# Patient Record
Sex: FEMALE | Race: WHITE | NOT HISPANIC OR LATINO | URBAN - METROPOLITAN AREA
[De-identification: names, ages, dates, MRNs, and addresses within clinical notes are randomized per-mention and may not be internally consistent; named-entity substitution may affect disease eponyms.]

---

## 2019-12-10 ENCOUNTER — EVALUATION (OUTPATIENT)
Dept: PHYSICAL THERAPY | Age: 54
End: 2019-12-10
Payer: COMMERCIAL

## 2019-12-10 DIAGNOSIS — R42 DIZZINESS: Primary | ICD-10-CM

## 2019-12-10 DIAGNOSIS — M54.2 CERVICAL MUSCLE PAIN: ICD-10-CM

## 2019-12-10 PROCEDURE — 97140 MANUAL THERAPY 1/> REGIONS: CPT | Performed by: PHYSICAL THERAPIST

## 2019-12-10 PROCEDURE — 97110 THERAPEUTIC EXERCISES: CPT | Performed by: PHYSICAL THERAPIST

## 2019-12-10 PROCEDURE — 97162 PT EVAL MOD COMPLEX 30 MIN: CPT | Performed by: PHYSICAL THERAPIST

## 2019-12-10 RX ORDER — CYCLOBENZAPRINE HCL 5 MG
5 TABLET ORAL 3 TIMES DAILY PRN
COMMUNITY
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
## 2019-12-11 ENCOUNTER — TRANSCRIBE ORDERS (OUTPATIENT)
Dept: PHYSICAL THERAPY | Age: 54
End: 2019-12-11

## 2019-12-11 DIAGNOSIS — M54.2 CERVICALGIA: Primary | ICD-10-CM

## 2019-12-17 ENCOUNTER — OFFICE VISIT (OUTPATIENT)
Dept: PHYSICAL THERAPY | Age: 54
End: 2019-12-17
Payer: COMMERCIAL

## 2019-12-17 DIAGNOSIS — M54.2 CERVICAL MUSCLE PAIN: ICD-10-CM

## 2019-12-17 DIAGNOSIS — R42 DIZZINESS: Primary | ICD-10-CM

## 2019-12-17 PROCEDURE — 97140 MANUAL THERAPY 1/> REGIONS: CPT | Performed by: PHYSICAL THERAPIST

## 2019-12-17 NOTE — PROGRESS NOTES
Daily Note     Today's date: 2019  Patient name: Abhilash Pena  : 1965  MRN: [id number]  Referring provider: Brian Davis MD  Dx:   Encounter Diagnosis     ICD-10-CM    1  Dizziness R42    2  Cervical muscle pain M54 2        Start Time: 96  Stop Time: 154  Total time in clinic (min): 30 minutes    Subjective: Pt reports she felt much more "clear" after her initial visit  She felt less tight and her episodes of symptoms that radiate into her neck and face are not constant and not as often  She feels she is managing them better with stretches as well  Objective: See treatment diary below      Assessment: Tolerated treatment well  Patient does seem ot have restrictions at C5-6 with decreased mobility of right UPA  She did have right scapular tightness present with trigger point in Levator scap and rhomboids today  Pt responds well to treatment and had no symptoms at the conclusion today, Cervical rotation was improved and full to right with mild left restriction of 5° less         Plan: Continue per plan of care  Will try to add exercises for posture NV       Precautions: standard      Manual  12/10 12/17           Cervical ROM  SCM stretch  U  Traps stretch  distraction 20 30                                                                   Exercise Diary  12/10 12/17           HEP 10            row  NV           posture row  NV           UBE  NV                                                                                                                                                                                                                           HEP- anterior chest opening on Swiss ball, SCM stretch    Modalities

## 2019-12-20 ENCOUNTER — OFFICE VISIT (OUTPATIENT)
Dept: PHYSICAL THERAPY | Age: 54
End: 2019-12-20
Payer: COMMERCIAL

## 2019-12-20 DIAGNOSIS — M54.2 CERVICAL MUSCLE PAIN: ICD-10-CM

## 2019-12-20 DIAGNOSIS — R42 DIZZINESS: Primary | ICD-10-CM

## 2019-12-20 PROCEDURE — 97140 MANUAL THERAPY 1/> REGIONS: CPT | Performed by: PHYSICAL THERAPIST

## 2019-12-20 PROCEDURE — 97110 THERAPEUTIC EXERCISES: CPT | Performed by: PHYSICAL THERAPIST

## 2019-12-20 NOTE — PROGRESS NOTES
Daily Note     Today's date: 2019  Patient name: Fracisco Pisano  : 1965  MRN: [id number]  Referring provider: rKis Purdy MD  Dx:   Encounter Diagnosis     ICD-10-CM    1  Dizziness R42    2  Cervical muscle pain M54 2        Start Time: 0700  Stop Time: 0740  Total time in clinic (min): 40 minutes    Subjective: Pt reports the tightness is no loner constant   Objective: See treatment diary below      Assessment: Tolerated treatment well  Patient able to add TE but did have discomfort with retro on UBE  Pt does continue to have tightness in B traps, cervical loly C4,5,6  Decreased mobility at C5-6  Plan: Continue per plan of care        Precautions: standard      Manual  12/10 12/17 12/20          Cervical ROM  SCM stretch  U  Traps stretch  distraction 20 30 30                                                                  Exercise Diary  12/10 12/17 12/20          HEP 10            row  NV 20#/ 30          posture row  NV 20          UBE  NV 4'                                                                                                                                                                                                                          HEP- anterior chest opening on Swiss ball, SCM stretch    Modalities

## 2019-12-24 ENCOUNTER — OFFICE VISIT (OUTPATIENT)
Dept: PHYSICAL THERAPY | Age: 54
End: 2019-12-24
Payer: COMMERCIAL

## 2019-12-24 DIAGNOSIS — M54.2 CERVICAL MUSCLE PAIN: ICD-10-CM

## 2019-12-24 DIAGNOSIS — R42 DIZZINESS: Primary | ICD-10-CM

## 2019-12-24 PROCEDURE — 97110 THERAPEUTIC EXERCISES: CPT | Performed by: PHYSICAL THERAPIST

## 2019-12-24 PROCEDURE — 97140 MANUAL THERAPY 1/> REGIONS: CPT | Performed by: PHYSICAL THERAPIST
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
## 2019-12-27 ENCOUNTER — OFFICE VISIT (OUTPATIENT)
Dept: PHYSICAL THERAPY | Age: 54
End: 2019-12-27
Payer: COMMERCIAL

## 2019-12-27 DIAGNOSIS — M54.2 CERVICAL MUSCLE PAIN: ICD-10-CM

## 2019-12-27 DIAGNOSIS — R42 DIZZINESS: Primary | ICD-10-CM

## 2019-12-27 PROCEDURE — 97012 MECHANICAL TRACTION THERAPY: CPT | Performed by: PHYSICAL THERAPIST

## 2019-12-27 PROCEDURE — 97110 THERAPEUTIC EXERCISES: CPT | Performed by: PHYSICAL THERAPIST

## 2019-12-27 PROCEDURE — 97140 MANUAL THERAPY 1/> REGIONS: CPT | Performed by: PHYSICAL THERAPIST

## 2019-12-27 NOTE — PROGRESS NOTES
Daily Note     Today's date: 2019  Patient name: Jannet Hernandez  : 1965  MRN: [id number]  Referring provider: Pete Lopez MD  Dx:   Encounter Diagnosis     ICD-10-CM    1  Dizziness R42    2  Cervical muscle pain M54 2        Start Time: 0700  Stop Time: 9869  Total time in clinic (min): 55 minutes    Subjective: Pt reports she felt good after last session but later that day had a flare up of her CC symptoms of pain in "SCM radiating into my ear and face  It was really fired up"  Objective: See treatment diary below      Assessment: Tolerated treatment well  Patient had no increased pain after mechanical traction and no increased pain at end of session  Plan: Continue per plan of care        Precautions: standard      Manual  12/10 12/17 12/20 12/24 12/27        Cervical ROM  SCM stretch  U  Traps stretch  distraction 20 30 30 30 30                                                                Exercise Diary  12/10 12/17 12/20 12/24 12/27        HEP 10            row  NV 20x tband 20x 20x        posture row  NV 20 20#/ 30 20#/ 30        UBE  NV 4' 4 4        cervical ext iso    10x 10x        L shoulder RTC iso     20x ER/IR/ext                                                                                                                                                                                              HEP- anterior chest opening on Swiss ball, SCM stretch    Modalities              [facility] tx     18#  15'

## 2019-12-31 ENCOUNTER — OFFICE VISIT (OUTPATIENT)
Dept: PHYSICAL THERAPY | Age: 54
End: 2019-12-31
Payer: COMMERCIAL

## 2019-12-31 DIAGNOSIS — R42 DIZZINESS: Primary | ICD-10-CM

## 2019-12-31 DIAGNOSIS — M54.2 CERVICAL MUSCLE PAIN: ICD-10-CM

## 2019-12-31 PROCEDURE — 97012 MECHANICAL TRACTION THERAPY: CPT | Performed by: PHYSICAL THERAPIST

## 2019-12-31 PROCEDURE — 97140 MANUAL THERAPY 1/> REGIONS: CPT | Performed by: PHYSICAL THERAPIST

## 2019-12-31 PROCEDURE — 97110 THERAPEUTIC EXERCISES: CPT | Performed by: PHYSICAL THERAPIST

## 2019-12-31 NOTE — PROGRESS NOTES
Daily Note     Today's date: 2019  Patient name: Olamide Lerner  : 1965  MRN: [id number]  Referring provider: Erin Vásquez MD  Dx:   Encounter Diagnosis     ICD-10-CM    1  Dizziness R42    2  Cervical muscle pain M54 2        Start Time: 0800  Stop Time: 0900  Total time in clinic (min): 60 minutes    Subjective: Pt had MRI of the brain yesterday and is awaiting results  MRI of the neck was denied but pt is considering paying for it out of pocket  Objective: See treatment diary below      Assessment: Tolerated treatment well  Patient continues to have multiple trigger points in upper traps, SCM, scalenes, cervical paraspinals  Pt responds well to Rx with decreased symptoms for several hours after PT  Pt continues to have flare ups of pain and tightness that radiates up into the right ear and face  Plan: Continue per plan of care        Precautions: standard      Manual  12/10 12/17 12/20 12/24 12/27 12/31       Cervical ROM  SCM stretch  U  Traps stretch  distraction 20 30 30 30 30 30                                                               Exercise Diary  12/10 12/17 12/20 12/24 12/27 12/31       HEP 10            row  NV 20x tband 20x 20x 20x       posture row  NV 20 20#/ 30 20#/ 30 20#/ 30       UBE  NV 4' 4 4 5'       cervical ext iso    10x 10x 10x       L shoulder RTC iso     20x ER/IR/ext 20x                                                                                                                                                                                             HEP- anterior chest opening on Swiss ball, SCM stretch    Modalities             [facility] tx     18#  15' 15'

## 2020-01-03 ENCOUNTER — OFFICE VISIT (OUTPATIENT)
Dept: PHYSICAL THERAPY | Age: 55
End: 2020-01-03
Payer: COMMERCIAL

## 2020-01-03 DIAGNOSIS — R42 DIZZINESS: Primary | ICD-10-CM

## 2020-01-03 DIAGNOSIS — M54.2 CERVICAL MUSCLE PAIN: ICD-10-CM

## 2020-01-03 PROCEDURE — 97110 THERAPEUTIC EXERCISES: CPT | Performed by: PHYSICAL THERAPIST

## 2020-01-03 PROCEDURE — 97140 MANUAL THERAPY 1/> REGIONS: CPT | Performed by: PHYSICAL THERAPIST

## 2020-01-03 PROCEDURE — 97012 MECHANICAL TRACTION THERAPY: CPT | Performed by: PHYSICAL THERAPIST

## 2020-01-03 NOTE — PROGRESS NOTES
Daily Note     Today's date: 1/3/2020  Patient name: Brittni Gusman  : 1965  MRN: [id number]  Referring provider: Kailee Stratton MD  Dx:   Encounter Diagnosis     ICD-10-CM    1  Dizziness R42    2  Cervical muscle pain M54 2        Start Time: 8404  Stop Time: 6942  Total time in clinic (min): 60 minutes    Subjective: Pt reports she is having much less frequent episodes of pain and tightness but when the muscle spasms the pain is 8/10  She no longer has constant pain and denies the dizziness and motion sensitivity she had experienced prior  "I feel like we are making progress and am much more positive"  Pt attempted basic yoga and had no symptom reproduction  Pt reports her MRI of the brain was normal       Objective: See treatment diary below      Assessment: Tolerated treatment well  Patient reports subjective improvement  PROM cervical improving  Pt remains tight in bilateral upper traps  Plan: Continue per plan of care        Precautions: standard      Manual  12/10 12/17 12/20 12/24 12/27 12/31 1/3      Cervical ROM  SCM stretch  U  Traps stretch  distraction 20 30 30 30 30 30 30'                                                              Exercise Diary  12/10 12/17 12/20 12/24 12/27 12/31 1/3      HEP 10            row  NV 20x tband 20x 20x 20x 20x      posture row  NV 20 20#/ 30 20#/ 30 20#/ 30 20#/ 30      UBE  NV 4' 4 4 5' 6'      cervical ext iso    10x 10x 10x 10      L shoulder RTC iso     20x ER/IR/ext 20x 20x                                                                                                                                                                                            HEP- anterior chest opening on Swiss ball, SCM stretch    Modalities             [facility] tx     18#  15' 15'

## 2020-01-07 ENCOUNTER — OFFICE VISIT (OUTPATIENT)
Dept: PHYSICAL THERAPY | Age: 55
End: 2020-01-07
Payer: COMMERCIAL

## 2020-01-07 DIAGNOSIS — M54.2 CERVICAL MUSCLE PAIN: ICD-10-CM

## 2020-01-07 DIAGNOSIS — R42 DIZZINESS: Primary | ICD-10-CM

## 2020-01-07 PROCEDURE — 97140 MANUAL THERAPY 1/> REGIONS: CPT | Performed by: PHYSICAL THERAPIST

## 2020-01-07 PROCEDURE — 97012 MECHANICAL TRACTION THERAPY: CPT | Performed by: PHYSICAL THERAPIST

## 2020-01-07 NOTE — PROGRESS NOTES
Daily Note     Today's date: 2020  Patient name: Willi Bone  : 1965  MRN: [id number]  Referring provider: Claudette Burrell MD  Dx:   Encounter Diagnosis     ICD-10-CM    1  Dizziness R42    2  Cervical muscle pain M54 2        Start Time: 1076  Stop Time: 09  Total time in clinic (min): 40 minutes    Subjective: Pt reports that yesterday was the best day she has had since the symptoms started months ago  She had no tightness, no episodes of facial/ear symptoms  Today she does report some tightness present in SCM  Pt is eager to try walking as she has not been able to exercise in months  Pt is reporting that the stretching and traction are helping  Objective: See treatment diary below      Assessment: Tolerated treatment well  Patient unable to finish TE due to work schedule  Decreased right cervical tightness and decreased tenderness in paraspinals  Pt remains tender along SCM  Plan: Continue per plan of care        Precautions: standard      Manual  12/10 12/17 12/20 12/24 12/27 12/31 1/3 1/7     Cervical ROM  SCM stretch  U  Traps stretch  distraction 20 30 30 30 30 30 30' 30'                                                             Exercise Diary  12/10 12/17 12/20 12/24 12/27 12/31 1/3 1/7     HEP 10            row  NV 20x tband 20x 20x 20x 20x NT     posture row  NV 20 20#/ 30 20#/ 30 20#/ 30 20#/ 30 NT     UBE  NV 4' 4 4 5' 6' NT     cervical ext iso    10x 10x 10x 10 NT     L shoulder RTC iso     20x ER/IR/ext 20x 20x NT                                                                                                                                                                                           HEP- anterior chest opening on Swiss ball, SCM stretch    Modalities      12/27 12/31 1/3 1/7     [facility] tx     18#  15' 15' 15 15

## 2020-01-10 ENCOUNTER — APPOINTMENT (OUTPATIENT)
Dept: PHYSICAL THERAPY | Age: 55
End: 2020-01-10
Payer: COMMERCIAL

## 2020-01-14 ENCOUNTER — OFFICE VISIT (OUTPATIENT)
Dept: PHYSICAL THERAPY | Age: 55
End: 2020-01-14
Payer: COMMERCIAL

## 2020-01-14 DIAGNOSIS — R42 DIZZINESS: Primary | ICD-10-CM

## 2020-01-14 DIAGNOSIS — M54.2 CERVICAL MUSCLE PAIN: ICD-10-CM

## 2020-01-14 PROCEDURE — 97012 MECHANICAL TRACTION THERAPY: CPT | Performed by: PHYSICAL THERAPIST

## 2020-01-14 PROCEDURE — 97140 MANUAL THERAPY 1/> REGIONS: CPT | Performed by: PHYSICAL THERAPIST

## 2020-01-14 PROCEDURE — 97110 THERAPEUTIC EXERCISES: CPT | Performed by: PHYSICAL THERAPIST

## 2020-01-14 NOTE — PROGRESS NOTES
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 and she is to continue PT  Neuro also felt tumor in brain was not contributing to any symptoms  Objective: See treatment diary below      Assessment: Tolerated treatment well  Patient making porgress with decreased subjective report of symptoms  Increased TE with no increased c/o during session  Focal tenderness with palpable lump at origin of SCM  Plan: Continue per plan of care        Precautions: standard      Manual  12/10 12/17 12/20 12/24 12/27 12/31 1/3 1/7 1/14    Cervical ROM  SCM stretch  U  Traps stretch  distraction 20 30 30 30 30 30 30' 30' 30'                                                            Exercise Diary  12/10 12/17 12/20 12/24 12/27 12/31 1/3 1/7 1/14    HEP 10            row  NV 20x tband 20x 20x 20x 20x NT 30x    posture row  NV 20 20#/ 30 20#/ 30 20#/ 30 20#/ 30 NT 30#/ 30    UBE  NV 4' 4 4 5' 6' NT 6'    cervical ext iso    10x 10x 10x 10 NT 30x    L shoulder RTC iso     20x ER/IR/ext 20x 20x NT 30x  B    bicep         30#/ 30    tricep         45#/ 30    Black[place]urns 3,4         15x ea    Boing         30"x3  FF                                                                                                                                      HEP- anterior chest opening on Swiss ball, SCM stretch    Modalities      12/27 12/31 1/3 1/7 1/14    [facility] tx     18#  15' 15' 15 15 15

## 2020-01-17 ENCOUNTER — TRANSCRIBE ORDERS (OUTPATIENT)
Dept: PHYSICAL THERAPY | Age: 55
End: 2020-01-17

## 2020-01-17 ENCOUNTER — OFFICE VISIT (OUTPATIENT)
Dept: PHYSICAL THERAPY | Age: 55
End: 2020-01-17
Payer: COMMERCIAL

## 2020-01-17 DIAGNOSIS — R42 DIZZINESS: Primary | ICD-10-CM

## 2020-01-17 DIAGNOSIS — M54.2 CERVICAL MUSCLE PAIN: ICD-10-CM

## 2020-01-17 DIAGNOSIS — R42 DIZZINESS AND GIDDINESS: Primary | ICD-10-CM

## 2020-01-17 PROCEDURE — 97140 MANUAL THERAPY 1/> REGIONS: CPT | Performed by: PHYSICAL THERAPIST

## 2020-01-17 PROCEDURE — 97012 MECHANICAL TRACTION THERAPY: CPT | Performed by: PHYSICAL THERAPIST

## 2020-01-17 PROCEDURE — 97110 THERAPEUTIC EXERCISES: CPT | Performed by: PHYSICAL THERAPIST
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
## 2020-01-21 ENCOUNTER — OFFICE VISIT (OUTPATIENT)
Dept: PHYSICAL THERAPY | Age: 55
End: 2020-01-21
Payer: COMMERCIAL

## 2020-01-21 DIAGNOSIS — M54.2 CERVICAL MUSCLE PAIN: ICD-10-CM

## 2020-01-21 DIAGNOSIS — R42 DIZZINESS: Primary | ICD-10-CM

## 2020-01-21 PROCEDURE — 97140 MANUAL THERAPY 1/> REGIONS: CPT | Performed by: PHYSICAL THERAPIST

## 2020-01-21 PROCEDURE — 97012 MECHANICAL TRACTION THERAPY: CPT | Performed by: PHYSICAL THERAPIST

## 2020-01-21 PROCEDURE — 97110 THERAPEUTIC EXERCISES: CPT | Performed by: PHYSICAL THERAPIST

## 2020-01-21 NOTE — PROGRESS NOTES
Daily Note     Today's date: 2020  Patient name: Edith Wong  : 1965  MRN: [id number]  Referring provider: Mar Veras MD  Dx:   Encounter Diagnosis     ICD-10-CM    1  Dizziness R42    2  Cervical muscle pain M54 2        Start Time:   Stop Time: 0900  Total time in clinic (min): 65 minutes    Subjective: Pt reports less spasms in the SCM but did note yesterday she had the muscle tighten and form "a lump" by the ear  Objective: See treatment diary below      Assessment: Tolerated treatment well  Patient has focal trigger points present in the SCM most noted distally today  Pt did tolerate acupressure and moderate stroking  May consider IASTM NV  Plan: Continue per plan of care        Precautions: standard      Manual  1/21 12/17 12/20 12/24 12/27 12/31 1/3 1/7 1/14 1/17   Cervical ROM  SCM stretch  U  Traps stretch  distraction 20 30 30 30 30 30 30' 30' 30' 30                                                           Exercise Diary  1/21 12/17 12/20 12/24 12/27 12/31 1/3 1/7 1/14 1/17   HEP             row 35#/ 30 NV 20x tband 20x 20x 20x 20x NT 30x 35#/ 30   posture row 35#/ 30 NV 20 20#/ 30 20#/ 30 20#/ 30 20#/ 30 NT 30#/ 30 35#/ 30   UBE 6' NV 4' 4 4 5' 6' NT 6' 6'   cervical ext iso 30x   10x 10x 10x 10 NT 30x 30x   L shoulder RTC iso 30x    20x ER/IR/ext 20x 20x NT 30x  B 30x   bicep 35#/ 30        30#/ 30 35#/ 30   tricep 50#/ 30        45#/ 30 50#/ 30   [place]urns 3,4 20x        15x ea 20x   Boing 30"x3        30"x3  FF 30"x3                                                                                                                                     HEP- anterior chest opening on Swiss ball, SCM stretch    Modalities  1/21    12/27 12/31 1/3 1/7 1/14 1/17   [facility] tx 15    18#  15' 15' 15 15 15 15

## 2020-01-24 ENCOUNTER — OFFICE VISIT (OUTPATIENT)
Dept: PHYSICAL THERAPY | Age: 55
End: 2020-01-24
Payer: COMMERCIAL

## 2020-01-24 DIAGNOSIS — R42 DIZZINESS: Primary | ICD-10-CM

## 2020-01-24 DIAGNOSIS — M54.2 CERVICAL MUSCLE PAIN: ICD-10-CM

## 2020-01-24 PROCEDURE — 97012 MECHANICAL TRACTION THERAPY: CPT | Performed by: PHYSICAL THERAPIST

## 2020-01-24 PROCEDURE — 97140 MANUAL THERAPY 1/> REGIONS: CPT | Performed by: PHYSICAL THERAPIST

## 2020-01-24 NOTE — PROGRESS NOTES
Daily Note     Today's date: 2020  Patient name: Brittni Gusman  : 1965  MRN: [id number]  Referring provider: Kailee Stratton MD  Dx:   Encounter Diagnosis     ICD-10-CM    1  Dizziness R42    2  Cervical muscle pain M54 2                   Subjective: Pt reports her neck "fired up" for 2 days but not as severe as it had been and did not provoke facial symptoms or dizziness  Pain /10  Current pain 2/10  Objective: See treatment diary below  Added trial of IASTM to Right SCM  Assessment: Tolerated treatment well  Patient has several trigger points along SCM muscle belly and at mastoid origion  Passive cervical ROM improved and WNL  Pt AROM remains limited at times by muscle spasm of SCM  Improved postural awareness but needs more endurance in postural muscle for work related activities on computer and prolonged driving to/from work  Pt able to now tolerate exercises/PREs  Deferred TE due to time  Plan: Continue per plan of care        Precautions: standard      Manual  1/21 1/24 12/20 12/24 12/27 12/31 1/3 1/7 1/14 1/17   Cervical ROM  SCM stretch  U  Traps stretch  distraction 20 25 30 30 30 30 30' 30' 30' 30   IASTM  5                                                      Exercise Diary  1/21 1/24 12/20 12/24 12/27 12/31 1/3 1/7 1/14 1/17   HEP             row 35#/ 30 NT 20x tband 20x 20x 20x 20x NT 30x 35#/ 30   posture row 35#/ 30 NT 20 20#/ 30 20#/ 30 20#/ 30 20#/ 30 NT 30#/ 30 35#/ 30   UBE 6' NT 4' 4 4 5' 6' NT 6' 6'   cervical ext iso 30x NT  10x 10x 10x 10 NT 30x 30x   L shoulder RTC iso 30x NT   20x ER/IR/ext 20x 20x NT 30x  B 30x   bicep 35#/ 30 NT       30#/ 30 35#/ 30   tricep 50#/ 30 NT       45#/ 30 50#/ 30   [place] 3,4 20x NT       15x ea 20x   Boing 30"x3 NT       30"x3  FF 30"x3                                                                                                                                     HEP- anterior chest opening on Swiss ball, SCM stretch    Modalities  1/21    12/27 12/31 1/3 1/7 1/14 1/17   mech tx 15    18#  15' 16' 15 15 15 15

## 2020-01-28 ENCOUNTER — OFFICE VISIT (OUTPATIENT)
Dept: PHYSICAL THERAPY | Age: 55
End: 2020-01-28
Payer: COMMERCIAL

## 2020-01-28 DIAGNOSIS — M54.2 CERVICAL MUSCLE PAIN: ICD-10-CM

## 2020-01-28 DIAGNOSIS — R42 DIZZINESS: Primary | ICD-10-CM

## 2020-01-28 PROCEDURE — 97140 MANUAL THERAPY 1/> REGIONS: CPT | Performed by: PHYSICAL THERAPIST

## 2020-01-28 PROCEDURE — 97110 THERAPEUTIC EXERCISES: CPT | Performed by: PHYSICAL THERAPIST

## 2020-01-28 PROCEDURE — 97012 MECHANICAL TRACTION THERAPY: CPT | Performed by: PHYSICAL THERAPIST

## 2020-01-31 ENCOUNTER — OFFICE VISIT (OUTPATIENT)
Dept: PHYSICAL THERAPY | Age: 55
End: 2020-01-31
Payer: COMMERCIAL

## 2020-01-31 ENCOUNTER — TELEPHONE (OUTPATIENT)
Dept: GASTROENTEROLOGY | Facility: CLINIC | Age: 55
End: 2020-01-31

## 2020-01-31 DIAGNOSIS — M54.2 CERVICAL MUSCLE PAIN: ICD-10-CM

## 2020-01-31 DIAGNOSIS — R42 DIZZINESS: Primary | ICD-10-CM

## 2020-01-31 PROCEDURE — 97012 MECHANICAL TRACTION THERAPY: CPT | Performed by: PHYSICAL THERAPIST

## 2020-01-31 PROCEDURE — 97140 MANUAL THERAPY 1/> REGIONS: CPT | Performed by: PHYSICAL THERAPIST

## 2020-01-31 NOTE — PROGRESS NOTES
Daily Note     Today's date: 2020  Patient name: Sushant Escobedo  : 1965  MRN: [id number]  Referring provider: Dicie Buerger, MD  Dx:   Encounter Diagnosis     ICD-10-CM    1  Dizziness R42    2  Cervical muscle pain M54 2                   Subjective: Pt had no episodes of intense pain, no "spasm" or "muscle firing up"  Pt reported minimal tightness  Objective: See treatment diary below      Assessment: Tolerated treatment well  Patient has less tenderness in right SCM, left does have trigger points present with tenderness  Plan: Continue per plan of care        Precautions: standard      Manual  1/21 1/24 1/28 1/31 12/27 12/31 1/3 1/7 1/14 1/17   Cervical ROM  SCM stretch  U  Traps stretch  distraction 20 25 25 25 30 30 30' 30' 30' 30   IASTM  5                                                      Exercise Diary  1/21 1/24 1/28 1/31 12/27 12/31 1/3 1/7 1/14 1/17   HEP             row 35#/ 30 NT 35#/ 30 20x 20x 20x 20x NT 30x 35#/ 30   posture row 35#/ 30 NT 35#/ 30 20#/ 30 20#/ 30 20#/ 30 20#/ 30 NT 30#/ 30 35#/ 30   UBE 6' NT 6' 4 4 5' 6' NT 6' 6'   cervical ext iso 30x NT 30x 10x 10x 10x 10 NT 30x 30x   B shoulder RTC iso 30x NT 30x ea  20x ER/IR/ext 20x 20x NT 30x  B 30x   bicep 35#/ 30 NT 35#/ 30      30#/ 30 35#/ 30   tricep 50#/ 30 NT 50#/ 30      45#/ 30 50#/ 30   [place] 3,4 20x NT 20x      15x ea 20x   Boing 30"x3 NT 30"x3      30"x3  FF 30"x3                                                                                                                                     HEP- anterior chest opening on Swiss ball, SCM stretch    Modalities  1/21 1/24 1/28 1/31 12/27 12/31 1/3 1/7 1/14 1/17   mech tx 15 10' 10'  18 static 10' 18#  15' 15' 15 15 15 15

## 2020-01-31 NOTE — TELEPHONE ENCOUNTER
Patient called to schedule routine colonoscopy  Please call [facility] at [phone] Ref: by Dr Brian Holt   If no answer please call cell at [phone] ty

## 2020-02-04 ENCOUNTER — OFFICE VISIT (OUTPATIENT)
Dept: PHYSICAL THERAPY | Age: 55
End: 2020-02-04
Payer: COMMERCIAL

## 2020-02-04 DIAGNOSIS — R42 DIZZINESS: Primary | ICD-10-CM

## 2020-02-04 DIAGNOSIS — M54.2 CERVICAL MUSCLE PAIN: ICD-10-CM

## 2020-02-04 PROCEDURE — 97140 MANUAL THERAPY 1/> REGIONS: CPT | Performed by: PHYSICAL THERAPIST

## 2020-02-04 PROCEDURE — 97012 MECHANICAL TRACTION THERAPY: CPT | Performed by: PHYSICAL THERAPIST

## 2020-02-04 NOTE — PROGRESS NOTES
Daily Note     Today's date: 2020  Patient name: Twilla Bumpers  : 1965  MRN: [id number]  Referring provider: Hermes Arita MD  Dx:   Encounter Diagnosis     ICD-10-CM    1  Dizziness R42    2  Cervical muscle pain M54 2        Start Time: 0800  Stop Time: 0840  Total time in clinic (min): 40 minutes    Subjective: Pt reports she had increased pain and muscle tightness/spasms in right SCM yesterday  She does note that the day prior, she went for a walk and attempted jogging  She noticed tightness immediately foolowing and persisted into yesterday  Today she reports the pain and spasm have decreased  Objective: See treatment diary below      Assessment: Tolerated treatment well  Patient had increased left cervical tightness, continues to have multiple trigger points in right SCM  Cervical ROM overall WNL passively  Responds well to Rx  Pt deferred TE today due to time  Plan: Continue per plan of care        Precautions: standard      Manual  1/21 1/24 1/28 1/31 2/4 12/31 1/3 1/7 1/14 1/17   Cervical ROM  SCM stretch  U  Traps stretch  distraction 20 25 25 25 30 30 30' 30' 30' 30   IASTM  5                                                      Exercise Diary   2/4 12/31 1/3 1/7 1/14 1/17   HEP             row 35#/ 30 NT 35#/ 30 20x NT 20x 20x NT 30x 35#/ 30   posture row 35#/ 30 NT 35#/ 30 20#/ 30 NT 20#/ 30 20#/ 30 NT 30#/ 30 35#/ 30   UBE 6' NT 6' 4 NT 5' 6' NT 6' 6'   cervical ext iso 30x NT 30x 10x NT 10x 10 NT 30x 30x   B shoulder RTC iso 30x NT 30x ea  NT 20x 20x NT 30x  B 30x   bicep 35#/ 30 NT 35#/ 30  NT    30#/ 30 35#/ 30   tricep 50#/ 30 NT 50#/ 30  NT    45#/ 30 50#/ 30   [place] 3,4 20x NT 20x  NT    15x ea 20x   Boing 30"x3 NT 30"x3  NT    30"x3  FF 30"x3                                                                                                                                     HEP- anterior chest opening on Swiss ball, SCM stretch    Modalities 1/21 1/24 1/28 1/31 2/4 12/31 1/3 1/7 1/14 1/17   mech tx 15 10' 8'  18 static 10' 18#  15' 16' 13 17 13 17

## 2020-02-07 ENCOUNTER — OFFICE VISIT (OUTPATIENT)
Dept: PHYSICAL THERAPY | Age: 55
End: 2020-02-07
Payer: COMMERCIAL

## 2020-02-07 DIAGNOSIS — R42 DIZZINESS: Primary | ICD-10-CM

## 2020-02-07 DIAGNOSIS — M54.2 CERVICAL MUSCLE PAIN: ICD-10-CM

## 2020-02-07 PROCEDURE — 97012 MECHANICAL TRACTION THERAPY: CPT | Performed by: PHYSICAL THERAPIST

## 2020-02-07 PROCEDURE — 97140 MANUAL THERAPY 1/> REGIONS: CPT | Performed by: PHYSICAL THERAPIST

## 2020-02-07 PROCEDURE — 97110 THERAPEUTIC EXERCISES: CPT | Performed by: PHYSICAL THERAPIST

## 2020-02-07 NOTE — PROGRESS NOTES
Daily Note     Today's date: 2020  Patient name: Vance Madrid  : 1965  MRN: [id number]  Referring provider: Yoanna Walker MD  Dx:   Encounter Diagnosis     ICD-10-CM    1  Dizziness R42    2  Cervical muscle pain M54 2        Start Time: 351  Stop Time:   Total time in clinic (min): 65 minutes    Subjective: Pt reports she had a bad day yesterday with tightness all day in her right SCM but not the severe pain she had in the past and no dizziness but slight facial symptoms  She reports today the pain has dissipated  Pt relates recent falre ups this week with trying to run over the weekend  Objective: See treatment diary below      Assessment: Tolerated treatment well  Patient has focla tenderness at mastoid portion of SCM on right, multi-nodular points on left  Bilateral trap tightness present  Plan: Continue per plan of care        Precautions: standard      Manual   2/4 2/7 1/3 1/7 1/14 1/17   Cervical ROM  SCM stretch  U  Traps stretch  distraction 20 25 25 25 30 30 30' 30' 30' 30   IASTM  5                                                      Exercise Diary   2/7 1/3 1/7 1/14 1/17   HEP             row 35#/ 30 NT 35#/ 30 20x NT 20x 20x NT 30x 35#/ 30   posture row 35#/ 30 NT 35#/ 30 20#/ 30 NT 20#/ 30 20#/ 30 NT 30#/ 30 35#/ 30   UBE 6' NT 6' 4 NT 5' 6' NT 6' 6'   cervical ext iso 30x NT 30x 10x NT 10x 10 NT 30x 30x   B shoulder RTC iso 30x NT 30x ea  NT 20x 20x NT 30x  B 30x   bicep 35#/ 30 NT 35#/ 30  NT 35#/ 30   30#/ 30 35#/ 30   tricep 50#/ 30 NT 50#/ 30  NT 50#/ 30   45#/ 30 50#/ 30   [place] 3,4 20x NT 20x  NT 20x   15x ea 20x   Boing 30"x3 NT 30"x3  NT 30"x3   30"x3  FF 30"x3                                                                                                                                     HEP- anterior chest opening on Swiss ball, SCM stretch    Modalities  1/21 1/24 1/28 1/31 2/4 12/31 1/3 1/7 1/14 1/17 [facility] tx 15 10' 10'  18 static 10' 18#  15' 15' 15 15 15 15

## 2020-02-11 ENCOUNTER — OFFICE VISIT (OUTPATIENT)
Dept: PHYSICAL THERAPY | Age: 55
End: 2020-02-11
Payer: COMMERCIAL

## 2020-02-11 DIAGNOSIS — R42 DIZZINESS: Primary | ICD-10-CM

## 2020-02-11 DIAGNOSIS — M54.2 CERVICAL MUSCLE PAIN: ICD-10-CM

## 2020-02-11 PROCEDURE — 97110 THERAPEUTIC EXERCISES: CPT | Performed by: PHYSICAL THERAPIST

## 2020-02-11 PROCEDURE — 97012 MECHANICAL TRACTION THERAPY: CPT | Performed by: PHYSICAL THERAPIST

## 2020-02-11 PROCEDURE — 97140 MANUAL THERAPY 1/> REGIONS: CPT | Performed by: PHYSICAL THERAPIST

## 2020-02-11 NOTE — PROGRESS NOTES
Daily Note     Today's date: 2020  Patient name: Lizzie Long  : 1965  MRN: [id number]  Referring provider: Libby Cancino MD  Dx:   Encounter Diagnosis     ICD-10-CM    1  Dizziness R42    2  Cervical muscle pain M54 2        Start Time:   Stop Time: 8562  Total time in clinic (min): 60 minutes    Subjective: Pt was excited, she was able to walk over the weekend 2 days, 3 miles each day without any symptom flare ups  Objective: See treatment diary below      Assessment: Tolerated treatment well  Patient continues to make progress despite occasional flare ups  She has been able to incorporate exercise including walking and is having less frequent and less intense flare ups  Focal trigger points vary day to day on right SCM  Left SCM tightness but less symptomatic compared to right  Plan: Continue per plan of care        Precautions: standard      Manual     Cervical ROM  SCM stretch  U  Traps stretch  distraction 20 25 25 25 30 30 30' 30' 30' 30   IASTM  5                                                      Exercise Diary   2   HEP             row 35#/ 30 NT 35#/ 30 20x NT 20x 35#/ 30 NT 30x 35#/ 30   posture row 35#/ 30 NT 35#/ 30 35#/ 30 NT 35#/ 30 40#/ 30 NT 30#/ 30 35#/ 30   UBE 6' NT 6' 4 NT 5' 6' NT 6' 6'   cervical ext iso 30x NT 30x 10x NT 30x 30 NT 30x 30x   B shoulder RTC iso 30x NT 30x ea  NT 20x 20x NT 30x  B 30x   bicep 35#/ 30 NT 35#/ 30  NT 35#/ 30 40#/ 30  30#/ 30 35#/ 30   tricep 50#/ 30 NT 50#/ 30  NT 50#/ 30 55#/ 30  45#/ 30 50#/ 30   [place] 3,4 20x NT 20x  NT 20x   15x ea 20x   Boing 30"x3 NT 30"x3  NT 30"x3   30"x3  FF 30"x3                                                                                                                                     HEP- anterior chest opening on Swiss ball, SCM stretch    Modalities  1/21 1 1/17   [facility] tx 15 10' 8'  18 static 10' 18#  15' 16' 15 15 15 15

## 2020-02-14 ENCOUNTER — OFFICE VISIT (OUTPATIENT)
Dept: PHYSICAL THERAPY | Age: 55
End: 2020-02-14
Payer: COMMERCIAL

## 2020-02-14 ENCOUNTER — TRANSCRIBE ORDERS (OUTPATIENT)
Dept: PHYSICAL THERAPY | Age: 55
End: 2020-02-14

## 2020-02-14 DIAGNOSIS — R42 DIZZINESS AND GIDDINESS: Primary | ICD-10-CM

## 2020-02-14 DIAGNOSIS — M54.2 CERVICAL MUSCLE PAIN: ICD-10-CM

## 2020-02-14 DIAGNOSIS — R42 DIZZINESS: Primary | ICD-10-CM

## 2020-02-14 PROCEDURE — 97140 MANUAL THERAPY 1/> REGIONS: CPT | Performed by: PHYSICAL THERAPIST

## 2020-02-14 PROCEDURE — 97012 MECHANICAL TRACTION THERAPY: CPT | Performed by: PHYSICAL THERAPIST
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
## 2020-02-18 ENCOUNTER — OFFICE VISIT (OUTPATIENT)
Dept: PHYSICAL THERAPY | Age: 55
End: 2020-02-18
Payer: COMMERCIAL

## 2020-02-18 DIAGNOSIS — R42 DIZZINESS: Primary | ICD-10-CM

## 2020-02-18 DIAGNOSIS — M54.2 CERVICAL MUSCLE PAIN: ICD-10-CM

## 2020-02-18 PROCEDURE — 97110 THERAPEUTIC EXERCISES: CPT | Performed by: PHYSICAL THERAPIST

## 2020-02-18 PROCEDURE — 97012 MECHANICAL TRACTION THERAPY: CPT | Performed by: PHYSICAL THERAPIST

## 2020-02-18 PROCEDURE — 97140 MANUAL THERAPY 1/> REGIONS: CPT | Performed by: PHYSICAL THERAPIST

## 2020-02-18 NOTE — PROGRESS NOTES
Daily Note     Today's date: 2020  Patient name: Anmol Arevalo  : 1965  MRN: [id number]  Referring provider: Rose Hubbard MD  Dx:   Encounter Diagnosis     ICD-10-CM    1  Dizziness R42    2  Cervical muscle pain M54 2        Start Time: 745  Stop Time: 5642  Total time in clinic (min): 65 minutes    Subjective: One episode of neck spasm in past week, pt does note she "feels some strain" on right SCM when performing left SB isometrics  Significant relief pf symptoms and decreased stiffness post treatment  Objective: See treatment diary below      Assessment: Tolerated treatment well  Patient had less trigger ponts present today along right SCM but did have one area at distal insertion that remains tender  No noted trigger at mastoid region today  Plan: Continue per plan of care        Precautions: standard      Manual   2   Cervical ROM  SCM stretch  U  Traps stretch  distraction 20 25 25 25 30 30 30' 30' 30' 30   IASTM  5                                                      Exercise Diary   2   HEP             row 35#/ 30 NT 35#/ 30 20x NT 20x 35#/ 30 NT 40#/ 30x 35#/ 30   posture row 35#/ 30 NT 35#/ 30 35#/ 30 NT 35#/ 30 40#/ 30 NT 40#/ 30 35#/ 30   UBE 6' NT 6' 4 NT 5' 6' NT 6' 6'   cervical ext iso 30x NT 30x 10x NT 30x 30 NT 30x 30x   B shoulder RTC iso 30x NT 30x ea  NT 20x 20x NT 30x  B 30x   bicep 35#/ 30 NT 35#/ 30  NT 35#/ 30 40#/ 30  40#/ 30 35#/ 30   tricep 50#/ 30 NT 50#/ 30  NT 50#/ 30 55#/ 30  60#/ 30 50#/ 30   Blackburns 3,4 20x NT 20x  NT 20x   30x ea 20x   Boing 30"x3 NT 30"x3  NT 30"x3   45"x3  FF 30"x3                                                                                                                                     HEP- anterior chest opening on Swiss ball, SCM stretch    Modalities  1/21 1/   [facility]  18 static 10' 18#  15' 16' 15 15 15 15

## 2020-02-21 ENCOUNTER — OFFICE VISIT (OUTPATIENT)
Dept: PHYSICAL THERAPY | Age: 55
End: 2020-02-21
Payer: COMMERCIAL

## 2020-02-21 DIAGNOSIS — M54.2 CERVICAL MUSCLE PAIN: ICD-10-CM

## 2020-02-21 DIAGNOSIS — R42 DIZZINESS: Primary | ICD-10-CM

## 2020-02-21 PROCEDURE — 97012 MECHANICAL TRACTION THERAPY: CPT | Performed by: PHYSICAL THERAPIST

## 2020-02-21 PROCEDURE — 97140 MANUAL THERAPY 1/> REGIONS: CPT | Performed by: PHYSICAL THERAPIST

## 2020-02-21 NOTE — PROGRESS NOTES
Daily Note     Today's date: 2020  Patient name: Jannet Hernandez  : 1965  MRN: [id number]  Referring provider: Pete Lopez MD  Dx:   Encounter Diagnosis     ICD-10-CM    1  Dizziness R42    2  Cervical muscle pain M54 2        Start Time: 0700  Stop Time: 0745  Total time in clinic (min): 45 minutes    Subjective: Pt reports "something feels different in the past week  Less of the intense tight feeling and more of a general muscle sorness"  Pt feels is has made more progress this week  Objective: See treatment diary below      Assessment: Tolerated treatment well  Patient has less tihtness and decreased symptoms post treatemtn  Pt defers TE today due to time constraints  Plan: Continue per plan of care        Precautions: standard      Manual     Cervical ROM  SCM stretch  U  Traps stretch  distraction 20 25 25 25 30 30 30' 30' 30' 30   IASTM  5                                                      Exercise Diary     HEP             row 35#/ 30 NT 35#/ 30 20x NT 20x 35#/ 30 NT 40#/ 30x NT   posture row 35#/ 30 NT 35#/ 30 35#/ 30 NT 35#/ 30 40#/ 30 NT 40#/ 30 NT   UBE 6' NT 6' 4 NT 5' 6' NT 6' NT   cervical ext iso 30x NT 30x 10x NT 30x 30 NT 30x NT   B shoulder RTC iso 30x NT 30x ea  NT 20x 20x NT 30x  B NT   bicep 35#/ 30 NT 35#/ 30  NT 35#/ 30 40#/ 30  40#/ 30 NT   tricep 50#/ 30 NT 50#/ 30  NT 50#/ 30 55#/ 30  60#/ 30 NT   [place]urns 3,4 20x NT 20x  NT 20x   30x ea NT   Boing 30"x3 NT 30"x3  NT 30"x3   45"x3  FF NT                                                                                                                                     HEP- anterior chest opening on Swiss ball, SCM stretch    Modalities   2   [facility]  18 static 10' 18#  15' 15' 15 15 15 15

## 2020-02-25 ENCOUNTER — OFFICE VISIT (OUTPATIENT)
Dept: PHYSICAL THERAPY | Age: 55
End: 2020-02-25
Payer: COMMERCIAL

## 2020-02-25 DIAGNOSIS — M54.2 CERVICAL MUSCLE PAIN: ICD-10-CM

## 2020-02-25 DIAGNOSIS — R42 DIZZINESS: Primary | ICD-10-CM

## 2020-02-25 PROCEDURE — 97012 MECHANICAL TRACTION THERAPY: CPT | Performed by: PHYSICAL THERAPIST

## 2020-02-25 PROCEDURE — 97140 MANUAL THERAPY 1/> REGIONS: CPT | Performed by: PHYSICAL THERAPIST

## 2020-02-25 PROCEDURE — 97110 THERAPEUTIC EXERCISES: CPT | Performed by: PHYSICAL THERAPIST

## 2020-02-25 NOTE — PROGRESS NOTES
Daily Note     Today's date: 2020  Patient name: Fracisco Pisano  : 1965  MRN: [id number]  Referring provider: Kris Purdy MD  Dx:   Encounter Diagnosis     ICD-10-CM    1  Dizziness R42    2  Cervical muscle pain M54 2        Start Time: 745  Stop Time: 7965  Total time in clinic (min): 70 minutes    Subjective: Pt reports she got a peloton bike and was bale to do beginner rides for 20 minutes with minimal discomfort  Objective: See treatment diary below      Assessment: Tolerated treatment well  Patient making good progress with less pain, decreased spams in neck and able to tolerate increased activity  Plan: Continue per plan of care        Precautions: standard      Manual     Cervical ROM  SCM stretch  U  Traps stretch  distraction  25 25 30 30 30' 30' 30' 30   IASTM  5                                                      Exercise Diary   2   HEP             row 50#/ 30 NT 35#/ 30 20x NT 20x 35#/ 30 NT 40#/ 30x NT   posture row 40#/ 30 NT 35#/ 30 35#/ 30 NT 35#/ 30 40#/ 30 NT 40#/ 30 NT   UBE 6' NT 6' 4 NT 5' 6' NT 6' NT   cervical ext iso 30x NT 30x 10x NT 30x 30 NT 30x NT   B shoulder RTC iso 30x NT 30x ea  NT 20x 20x NT 30x  B NT   bicep 40#/ 30 NT 35#/ 30  NT 35#/ 30 40#/ 30  40#/ 30 NT   tricep 60#/ 30 NT 50#/ 30  NT 50#/ 30 55#/ 30  60#/ 30 NT   [place]urns 3,4 30x NT 20x  NT 20x   30x ea NT   Boing 45"x3 NT 30"x3  NT 30"x3   45"x3  FF NT                                                                                                                                     HEP- anterior chest opening on Swiss ball, SCM stretch    Modalities   2   mech tx 15  20# 10' 10'  18 static 10' 18#  15' 15' 15 15 15 15

## 2020-02-28 ENCOUNTER — APPOINTMENT (OUTPATIENT)
Dept: PHYSICAL THERAPY | Age: 55
End: 2020-02-28
Payer: COMMERCIAL

## 2020-03-03 ENCOUNTER — OFFICE VISIT (OUTPATIENT)
Dept: PHYSICAL THERAPY | Age: 55
End: 2020-03-03
Payer: COMMERCIAL

## 2020-03-03 DIAGNOSIS — M54.2 CERVICAL MUSCLE PAIN: ICD-10-CM

## 2020-03-03 DIAGNOSIS — R42 DIZZINESS: Primary | ICD-10-CM

## 2020-03-03 PROCEDURE — 97110 THERAPEUTIC EXERCISES: CPT | Performed by: PHYSICAL THERAPIST

## 2020-03-03 PROCEDURE — 97012 MECHANICAL TRACTION THERAPY: CPT | Performed by: PHYSICAL THERAPIST

## 2020-03-03 PROCEDURE — 97140 MANUAL THERAPY 1/> REGIONS: CPT | Performed by: PHYSICAL THERAPIST

## 2020-03-03 NOTE — PROGRESS NOTES
Daily Note     Today's date: 3/3/2020  Patient name: Juanis Alvarado  : 1965  MRN: [id number]  Referring provider: Xavier Lazar MD  Dx:   Encounter Diagnosis     ICD-10-CM    1  Dizziness R42    2  Cervical muscle pain M54 2        Start Time: 745  Stop Time: 845  Total time in clinic (min): 60 minutes    Subjective: Pt reports she has been concentrating on SCM specific stretches and been using some breathing techniques and she feels the past week has been really good and no flare ups  She has been able to ride her peloton bike and do some yoga  Objective: See treatment diary below      Assessment: Tolerated treatment well  Patient continues to have decreased symtpoms, increased activity and no intense flare ups  Plan: Continue per plan of care        Precautions: standard      Manual  2/25 3/3 1/28 1/31 2/4 2/7 2/11 2/14 2/18 2/21   Cervical ROM  SCM stretch  U  Traps stretch  distraction 20 25 25 25 30 30 30' 30' 30' 30   IASTM  5                                                      Exercise Diary  2/25 3/3 1/28 1/31 2/4 2/7 2/11 2/14 2/18 2/21   HEP             row 50#/ 30 50#/ 30 35#/ 30 20x NT 20x 35#/ 30 NT 40#/ 30x NT   posture row 40#/ 30 40#/ 30 35#/ 30 35#/ 30 NT 35#/ 30 40#/ 30 NT 40#/ 30 NT   UBE 6' 6' 6' 4 NT 5' 6' NT 6' NT   cervical ext iso 30x 30x 30x 10x NT 30x 30 NT 30x NT   B shoulder RTC iso 30x 30x 30x ea  NT 20x 20x NT 30x  B NT   bicep 40#/ 30 40#/ 30 35#/ 30  NT 35#/ 30 40#/ 30  40#/ 30 NT   tricep 60#/ 30 60#/ 30 50#/ 30  NT 50#/ 30 55#/ 30  60#/ 30 NT   Black[place]urns 3,4 30x 30x 20x  NT 20x   30x ea NT   Boing 45"x3 45"x3 30"x3  NT 30"x3   45"x3  FF NT                                                                                                                                     HEP- anterior chest opening on Swiss ball, SCM stretch    Modalities  2/25 3/3 1/28    mech tx 15  20# 20#  10 10'  18 static 10 18#  15' 15' 15 15 15 15

## 2020-03-05 ENCOUNTER — TELEPHONE (OUTPATIENT)
Dept: GASTROENTEROLOGY | Facility: CLINIC | Age: 55
End: 2020-03-05

## 2020-03-05 NOTE — TELEPHONE ENCOUNTER
Patient would like to direct jayla a colonoscopy     Please phone her today     She stated he left 3 messages with no return call     [phone]

## 2020-03-05 NOTE — TELEPHONE ENCOUNTER
03/05/20  Screened by: Travis Olivia    Referring Provider     : If patient answers NO to medical questions, then schedule procedure  If patient answers YES to medical questions, then schedule office appointment  Previous Colonoscopy yes  Date and Facility of last colonoscopy? 10 years    Comments:         Pre- Screening: There is no height or weight on file to calculate BMI  Has patient been referred for a routine screening Colonoscopy? yes  Is the patient between 39-70 years old? yes    SCHEDULING STAFF   If the patient is between 45yrs-49yrs, please advise patient to confirm benefits/coverage with their insurance company for a routine screening colonoscopy, some insurance carriers will only cover at [id number] or older   If the patient is over 76years old, please schedule an office visit   If the patient had a previous colonoscopy send to the procedure  before continuing    Have you been diagnosed with a bleeding disorder or anemia? no    Do you take no    Have you been diagnosed with Diabetes or are you taking any   Diabetic medications? no    Do you have any of the following symptoms?  no    Have you had a coronary or vascular stent within the last year? no    Have you had a heart attack or stroke in the last 6 months? no    Have you had intestinal surgery in the last 3 months? no    Do you have problems with: no    Do you use: no    Have you been hospitalized in the last Month? no    Have you had chest pain (angina) or breathing problems  (COPD) in the last 3 months? no    Do you have any difficulty walking up a flight of stairs? no    Have you had Kidney failure or insufficiency? no    Have you had heart valve surgery? no    Are you confined to a wheelchair?  no

## 2020-03-10 ENCOUNTER — EVALUATION (OUTPATIENT)
Dept: PHYSICAL THERAPY | Age: 55
End: 2020-03-10
Payer: COMMERCIAL

## 2020-03-10 DIAGNOSIS — R42 DIZZINESS: Primary | ICD-10-CM

## 2020-03-10 DIAGNOSIS — M54.2 CERVICAL MUSCLE PAIN: ICD-10-CM

## 2020-03-10 PROCEDURE — 97140 MANUAL THERAPY 1/> REGIONS: CPT | Performed by: PHYSICAL THERAPIST

## 2020-03-10 PROCEDURE — 97012 MECHANICAL TRACTION THERAPY: CPT | Performed by: PHYSICAL THERAPIST
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
## 2020-03-12 ENCOUNTER — TRANSCRIBE ORDERS (OUTPATIENT)
Dept: PHYSICAL THERAPY | Age: 55
End: 2020-03-12

## 2020-03-12 DIAGNOSIS — R42 DIZZINESS AND GIDDINESS: Primary | ICD-10-CM

## 2020-03-13 ENCOUNTER — APPOINTMENT (OUTPATIENT)
Dept: PHYSICAL THERAPY | Age: 55
End: 2020-03-13
Payer: COMMERCIAL

## 2020-03-17 ENCOUNTER — OFFICE VISIT (OUTPATIENT)
Dept: PHYSICAL THERAPY | Age: 55
End: 2020-03-17
Payer: COMMERCIAL

## 2020-03-17 DIAGNOSIS — M54.2 CERVICAL MUSCLE PAIN: ICD-10-CM

## 2020-03-17 DIAGNOSIS — R42 DIZZINESS: Primary | ICD-10-CM

## 2020-03-17 PROCEDURE — 97012 MECHANICAL TRACTION THERAPY: CPT | Performed by: PHYSICAL THERAPIST

## 2020-03-17 PROCEDURE — 97140 MANUAL THERAPY 1/> REGIONS: CPT | Performed by: PHYSICAL THERAPIST

## 2020-03-17 NOTE — PROGRESS NOTES
Daily Note     Today's date: 3/17/2020  Patient name: Monalisa Green  : 1965  MRN: [id number]  Referring provider: Molina King MD  Dx:   Encounter Diagnosis     ICD-10-CM    1  Dizziness R42    2  Cervical muscle pain M54 2        Start Time: 0800  Stop Time: 0840  Total time in clinic (min): 40 minutes    Subjective: Pt reports she is feeling pretty good  She has been able to increase her exercises at home and rode her peloton bike for 45 minutes  Objective: See treatment diary below      Assessment: Tolerated treatment well  Patient continues to progress towards treatment goals and is demonstrating improved function and increased recreational activity with good toelrance  Plan: Continue per plan of care        Precautions: standard      Manual  2/25 3/3 3/10 3/16 2/4 2/7 2/11 2/14 2/18 2/21   Cervical ROM  SCM stretch  U  Traps stretch  distraction 20 25 25 25 30 30 30' 30' 30' 30   IASTM  5                                                      Exercise Diary  2/25 3/3 3/10 3/16 2/4 2/7 2/11 2/14 2/18 2/21   HEP             row 50#/ 30 50#/ 30 NT  NT 20x 35#/ 30 NT 40#/ 30x NT   posture row 40#/ 30 40#/ 30 NT  NT 35#/ 30 40#/ 30 NT 40#/ 30 NT   UBE 6' 6' NT  NT 5' 6' NT 6' NT   cervical ext iso 30x 30x NT  NT 30x 30 NT 30x NT   B shoulder RTC iso 30x 30x NT  NT 20x 20x NT 30x  B NT   bicep 40#/ 30 40#/ 30 NT  NT 35#/ 30 40#/ 30  40#/ 30 NT   tricep 60#/ 30 60#/ 30 NT  NT 50#/ 30 55#/ 30  60#/ 30 NT   Blackburns 3,4 30x 30x NT  NT 20x   30x ea NT   Boing 45"x3 45"x3 NT  NT 30"x3   45"x3  FF NT                                                                                                                                     HEP- anterior chest opening on Swiss ball, SCM stretch    Modalities  2/25 3/3 3/10 3/16 2/4 12/31 2/11 2/14 2/18 2/21   mech tx 15  20# 20#  10 10'  20# static 10'  20# static 18#  15' 15' 15 15 15 15

## 2020-03-24 ENCOUNTER — APPOINTMENT (OUTPATIENT)
Dept: PHYSICAL THERAPY | Age: 55
End: 2020-03-24
Payer: COMMERCIAL

## 2020-03-31 ENCOUNTER — APPOINTMENT (OUTPATIENT)
Dept: PHYSICAL THERAPY | Age: 55
End: 2020-03-31
Payer: COMMERCIAL

## 2020-03-31 NOTE — PROGRESS NOTES
Pt called stated she is doing well and is exercising and stretching daily at home  Pt was concerned with COVID-19 and will d/c to HEP

## 2020-10-19 ENCOUNTER — TELEPHONE (OUTPATIENT)
Dept: GASTROENTEROLOGY | Facility: CLINIC | Age: 55
End: 2020-10-19

## 2020-12-14 ENCOUNTER — TELEPHONE (OUTPATIENT)
Dept: GASTROENTEROLOGY | Facility: CLINIC | Age: 55
End: 2020-12-14

## 2020-12-18 ENCOUNTER — LAB REQUISITION (OUTPATIENT)
Dept: LAB | Facility: HOSPITAL | Age: 55
End: 2020-12-18
Payer: COMMERCIAL

## 2020-12-18 DIAGNOSIS — K63.5 POLYP OF COLON: ICD-10-CM

## 2020-12-18 PROCEDURE — 88305 TISSUE EXAM BY PATHOLOGIST: CPT | Performed by: PATHOLOGY

## 2020-12-21 ENCOUNTER — TELEPHONE (OUTPATIENT)
Dept: GASTROENTEROLOGY | Facility: CLINIC | Age: 55
End: 2020-12-21

## 2021-04-10 ENCOUNTER — IMMUNIZATIONS (OUTPATIENT)
Dept: FAMILY MEDICINE CLINIC | Facility: HOSPITAL | Age: 56
End: 2021-04-10

## 2021-04-10 DIAGNOSIS — Z23 ENCOUNTER FOR IMMUNIZATION: Primary | ICD-10-CM

## 2021-04-10 PROCEDURE — 91300 SARS-COV-2 / COVID-19 MRNA VACCINE (PFIZER-BIONTECH) 30 MCG: CPT

## 2021-04-10 PROCEDURE — 0001A SARS-COV-2 / COVID-19 MRNA VACCINE (PFIZER-BIONTECH) 30 MCG: CPT

## 2021-05-09 ENCOUNTER — IMMUNIZATIONS (OUTPATIENT)
Dept: FAMILY MEDICINE CLINIC | Facility: HOSPITAL | Age: 56
End: 2021-05-09

## 2021-05-09 DIAGNOSIS — Z23 ENCOUNTER FOR IMMUNIZATION: Primary | ICD-10-CM

## 2021-05-09 PROCEDURE — 91300 SARS-COV-2 / COVID-19 MRNA VACCINE (PFIZER-BIONTECH) 30 MCG: CPT

## 2021-05-09 PROCEDURE — 0002A SARS-COV-2 / COVID-19 MRNA VACCINE (PFIZER-BIONTECH) 30 MCG: CPT

## 2023-07-31 ENCOUNTER — APPOINTMENT (OUTPATIENT)
Dept: RADIOLOGY | Facility: CLINIC | Age: 58
End: 2023-07-31
Payer: COMMERCIAL

## 2023-07-31 ENCOUNTER — OFFICE VISIT (OUTPATIENT)
Dept: URGENT CARE | Facility: CLINIC | Age: 58
End: 2023-07-31
Payer: COMMERCIAL

## 2023-07-31 VITALS
OXYGEN SATURATION: 96 % | SYSTOLIC BLOOD PRESSURE: 123 MMHG | HEART RATE: 77 BPM | TEMPERATURE: 99.8 F | RESPIRATION RATE: 18 BRPM | DIASTOLIC BLOOD PRESSURE: 79 MMHG

## 2023-07-31 DIAGNOSIS — M79.631 RIGHT FOREARM PAIN: ICD-10-CM
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
## 2023-07-31 DIAGNOSIS — S50.11XA CONTUSION OF RIGHT FOREARM, INITIAL ENCOUNTER: ICD-10-CM

## 2023-07-31 DIAGNOSIS — M79.631 RIGHT FOREARM PAIN: Primary | ICD-10-CM

## 2023-07-31 PROCEDURE — 73090 X-RAY EXAM OF FOREARM: CPT

## 2023-07-31 PROCEDURE — 99213 OFFICE O/P EST LOW 20 MIN: CPT

## 2023-07-31 RX ORDER — FLUOXETINE 10 MG/1
10 CAPSULE ORAL DAILY
COMMUNITY
Start: 2023-05-22

## 2023-07-31 RX ORDER — CHLORAL HYDRATE 500 MG
CAPSULE ORAL
COMMUNITY

## 2023-07-31 RX ORDER — MULTIVIT WITH MINERALS/LUTEIN
TABLET ORAL
COMMUNITY

## 2023-07-31 NOTE — PATIENT INSTRUCTIONS
Check my chart for final radiology results. Tylenol/motrin as needed for pain/swelling. Ice/elevate. Follow up with PCP in 3-5 days. Proceed to the ER with worsening symptoms.

## 2023-07-31 NOTE — PROGRESS NOTES
[facility]        NAME: Daja Mckeon is a 63 y.o. female  : 1965    MRN: [id number]  DATE: 2023  TIME: 12:13 PM    Assessment and Plan   Right forearm pain [M79.631]  1. Right forearm pain  XR forearm 2 vw right      2. Fall, initial encounter  XR forearm 2 vw right      3. Contusion of right forearm, initial encounter          Xray appears negative for any fracture. Will follow up with radiologist report when available. Patient Instructions     Check my chart for final radiology results. Tylenol/motrin as needed for pain/swelling. Ice/elevate. Follow up with PCP in 3-5 days. Proceed to the ER with worsening symptoms. Chief Complaint     Chief Complaint   Patient presents with   • arm injury     Patient was coming down steps and fell onto right arm. Bruising present. Reports arm is feeling numb and heavy. Took aleve with some relief. History of Present Illness       The patient presents today with complaints of R forearm pain after falling down a few indoor stairs this morning. She denies any other injury. She is having pain to her proximal forearm just below her elbow. She has a superficial abrasion with mild ecchymosis and swelling. She has full ROM to her elbow and wrist. Denies history of previous fracture/surgery. Review of Systems   Review of Systems   Constitutional: Negative for chills and fever. Musculoskeletal: Positive for arthralgias (R forearm). Negative for myalgias. Skin: Positive for color change (R forearm). Negative for rash. Psychiatric/Behavioral: Negative.           Current Medications       Current Outpatient Medications:   •  Ascorbic Acid (vitamin C) 1000 MG tablet, Vitamin C, Disp: , Rfl:   •  FLUoxetine (PROzac) 10 mg capsule, Take 10 mg by mouth daily, Disp: , Rfl:   •  Omega-3 Fatty Acids (fish oil) 1,000 mg, Fish Oil, Disp: , Rfl:   •  Omega-3 Fatty Acids (Omega-3 Fish Oil) 1000 MG CAPS, Fish Oil, Disp: , Rfl:   • cyclobenzaprine (FLEXERIL) 5 mg tablet, Take 5 mg by mouth 3 (three) times a day as needed for muscle spasms (Patient not taking: Reported on 7/31/2023), Disp: , Rfl:     Current Allergies     Allergies as of 07/31/2023   • (No Known Allergies)            The following portions of the patient's history were reviewed and updated as appropriate: allergies, current medications, past family history, past medical history, past social history, past surgical history and problem list.     History reviewed. No pertinent past medical history. Past Surgical History:   Procedure Laterality Date   • BUNIONECTOMY Right        History reviewed. No pertinent family history. Medications have been verified. Objective   /79   Pulse 77   Temp 99.8 °F (37.7 °C)   Resp 18   SpO2 96%        Physical Exam     Physical Exam  Vitals and nursing note reviewed. Constitutional:       General: She is not in acute distress. Appearance: Normal appearance. She is not ill-appearing. HENT:      Head: Normocephalic and atraumatic. Right Ear: External ear normal.      Left Ear: External ear normal.      Nose: Nose normal.      Mouth/Throat:      Lips: Pink. Mouth: Mucous membranes are moist.   Eyes:      General: Vision grossly intact. Extraocular Movements: Extraocular movements intact. Pupils: Pupils are equal, round, and reactive to light. Cardiovascular:      Rate and Rhythm: Normal rate. Pulmonary:      Effort: Pulmonary effort is normal.   Musculoskeletal:         General: Normal range of motion. Right forearm: Swelling (mild), tenderness and bony tenderness present. No deformity. Left forearm: Normal.        Arms:       Cervical back: Normal range of motion. Skin:     General: Skin is warm. Findings: Abrasion (R forearm) and ecchymosis (R forearm) present. No rash. Neurological:      Mental Status: She is alert and oriented to person, place, and time.       Motor: Motor function is intact. Gait: Gait is intact.    Psychiatric:         Attention and Perception: Attention normal.         Mood and Affect: Mood normal.

## 2024-08-28 ENCOUNTER — OFFICE VISIT (OUTPATIENT)
Dept: GASTROENTEROLOGY | Facility: CLINIC | Age: 59
End: 2024-08-28
Payer: COMMERCIAL

## 2024-08-28 VITALS
HEART RATE: 84 BPM | BODY MASS INDEX: 24.23 KG/M2 | SYSTOLIC BLOOD PRESSURE: 135 MMHG | TEMPERATURE: 97.4 F | OXYGEN SATURATION: 98 % | WEIGHT: 154.4 LBS | DIASTOLIC BLOOD PRESSURE: 86 MMHG | HEIGHT: 67 IN

## 2024-08-28 DIAGNOSIS — K21.9 GASTROESOPHAGEAL REFLUX DISEASE WITHOUT ESOPHAGITIS: ICD-10-CM

## 2024-08-28 DIAGNOSIS — K59.01 SLOW TRANSIT CONSTIPATION: Primary | ICD-10-CM

## 2024-08-28 PROCEDURE — 99204 OFFICE O/P NEW MOD 45 MIN: CPT | Performed by: INTERNAL MEDICINE

## 2024-08-28 RX ORDER — LIFITEGRAST 50 MG/ML
SOLUTION/ DROPS OPHTHALMIC
COMMUNITY
Start: 2024-08-27

## 2024-08-28 RX ORDER — FAMOTIDINE 20 MG/1
20 TABLET, FILM COATED ORAL 2 TIMES DAILY
Qty: 62 TABLET | Refills: 11 | Status: SHIPPED | OUTPATIENT
Start: 2024-08-28

## 2024-08-28 RX ORDER — POLYETHYLENE GLYCOL 3350 17 G/17G
POWDER, FOR SOLUTION ORAL
COMMUNITY
Start: 1995-01-01

## 2024-08-28 NOTE — PROGRESS NOTES
[facility] - Outpatient Consultation  Meche Lrema 59 y.o. female MRN: [id number]  Encounter: [id number]          ASSESSMENT AND PLAN:      1. Slow transit constipation  -No indication for EGD or colonoscopy  -We discussed the absence of promotility drugs for the colon  -Preferably MiraLAX taken twice daily should be the current recommended therapy  -We discussed Linzess as an alternative or addition if needed  -High-fiber diet to include fruits, vegetables, and whole grain foods, daily  -Increase water intake daily  -Colonoscopy was reviewed from 12/18/2020  -We had a full discussion about motility problems with the colon.  I told her that her motility was slowed and that there are no promotility drugs available on the market.  We discussed the concept of the osmotic effect or benefit from drugs like MiraLAX or Linzess.  I offered the possibility of utilizing Linzess in the future if the double dose of MiraLAX is unsuccessful.    2. Gastroesophageal reflux disease without esophagitis  - famotidine (PEPCID) 20 mg tablet; Take 1 tablet (20 mg total) by mouth 2 (two) times a day  Dispense: 62 tablet; Refill: 11  -The above famotidine will be tried initially.  I told her that it is possible that thisdose can be increased to 40 mg twice daily.  Alternatively, we can use a proton pump inhibitor in the morning time and use a H2 antagonist like Pepcid in the evening time.  -No lying down within 1 hour of snacking or eating  -The GERD handout sheet was provided to the patient and discussed in detail  -Elevate the back of the bed 6 inches permanently.  -I had a full discussion about the extra esophageal manifestations of gastroesophageal reflux disease.  I told her it is not unusual to have congestion and cough without heartburn symptoms.  I told her that 30% of the patients with gastroesophageal reflux disease experience an extra and esophageal manifestation  -No indication for surgical  therapy such as fundoplication or TIF  -CT scan of the chest with contrast on 7/11/2024 was reviewed    Follow-up in 2 months    ______________________________________________________________________    HPI: Isis is a 59-year-old female who comes the office today with a complaint of constant chronic cough and inability to clear her throat with an uncomfortable and weird feeling in the upper chest.  She was tried on inhalers because there was a suspicion that this might be asthma but the inhalers did not work.  She also complains of some intermittent hoarseness.  She underwent treatment with a steroid pack as well as antibiotics and had no relief of the symptoms with this.  She tried steroid inhalation without any relief.  CT scan of the lungs with contrast was performed on on 7/11/2024.  There was no significant abnormality seen on the CT scan.  She has never undergone an EGD.    Additionally she has been having problems with constipation for many years.  She has been taking MiraLAX 1 capful in 8 fluid ounces of water daily with some benefit.  She has had to add citrate of magnesium to this and wants to know what additional steps could be taken to help her with the constipation problem.  On some days her bowel movements are daily, however another days her bowel movements could be every 2 to 3 days.  This was the reason why she has been adding additional citrate of magnesium.      REVIEW OF SYSTEMS:    CONSTITUTIONAL: Denies any fever, chills, rigors, and weight loss.  HEENT: No earache or tinnitus. Denies hearing loss or visual disturbances.  CARDIOVASCULAR: No chest pain or palpitations.   RESPIRATORY: Denies any cough, hemoptysis, shortness of breath or dyspnea on exertion.  GASTROINTESTINAL: As noted in the History of Present Illness.   GENITOURINARY: No problems with urination. Denies any hematuria or dysuria.  NEUROLOGIC: No dizziness or vertigo, denies headaches.   MUSCULOSKELETAL: Denies any muscle or  "joint pain.   SKIN: Denies skin rashes or itching.   ENDOCRINE: Denies excessive thirst. Denies intolerance to heat or cold.  PSYCHOSOCIAL: Denies depression or anxiety. Denies any recent memory loss.       Historical Information   History reviewed. No pertinent past medical history.  Past Surgical History:   Procedure Laterality Date    BUNIONECTOMY Right     COLONOSCOPY       Social History   Social History     Substance and Sexual Activity   Alcohol Use Yes    Alcohol/week: 1.0 standard drink of alcohol    Types: 1 Glasses of wine per week     Social History     Substance and Sexual Activity   Drug Use Never     Social History     Tobacco Use   Smoking Status Never   Smokeless Tobacco Never     Family History   Problem Relation Age of Onset    Hypertension Mother     Colon polyps Father     Heart disease Father     Colon cancer Paternal Grandmother        Meds/Allergies       Current Outpatient Medications:     Ascorbic Acid (vitamin C) 1000 MG tablet    cyclobenzaprine (FLEXERIL) 5 mg tablet    famotidine (PEPCID) 20 mg tablet    FLUoxetine (PROzac) 10 mg capsule    Magnesium 125 MG CAPS    Omega-3 Fatty Acids (Omega-3 Fish Oil) 1000 MG CAPS    polyethylene glycol (MiraLax) 17 GM/SCOOP powder    Xiidra 5 % op solution    No Known Allergies        Objective     Blood pressure 135/86, pulse 84, temperature (!) 97.4 °F (36.3 °C), temperature source Temporal, height 5' 7\" (1.702 m), weight 70 kg (154 lb 6.4 oz), SpO2 98%. Body mass index is 24.18 kg/m².        PHYSICAL EXAM:      General Appearance:   Alert, cooperative, no distress   HEENT:   Normocephalic, atraumatic, anicteric.     Neck:  Supple, symmetrical, trachea midline   Lungs:   Clear to auscultation bilaterally; no rales, rhonchi or wheezing; respirations unlabored    Heart::   Regular rate and rhythm; no murmur, rub, or gallop.   Abdomen:   Soft, non-tender, non-distended; normal bowel sounds; no masses, no organomegaly    Genitalia:   Deferred  "   Rectal:   Deferred    Extremities:  No cyanosis, clubbing or edema    Pulses:  2+ and symmetric    Skin:  No jaundice, rashes, or lesions    Lymph nodes:  No palpable cervical lymphadenopathy        Lab Results:   No visits with results within 1 Day(s) from this visit.   Latest known visit with results is:   Lab Requisition on 12/18/2020   Component Date Value    Case Report 12/18/2020                      Value:Surgical Pathology Report                         Case: [id number]                                   Authorizing Provider:  Tobais Correa DO          Collected:           12/18/2020 0732              Ordering Location:     [facility]      Received:            12/18/2020 [address] [place] [address] Specialty                                                                                  Laboratory                                                                   Pathologist:           Cristal Villalobos MD                                                                  Specimens:   A) - Polyp, Colorectal, ascending colon                                                             B) - Polyp, Colorectal, descending colon                                                   Final Diagnosis 12/18/2020                      Value:A. Colon, Ascending colon, Polypectomy:                          - Tubular adenoma.                          - No high grade dysplasia and no evidence of malignancy.                                                     B. Colon, Descending colon, Polypectomy:                          - Tubular adenoma.                          - No high grade dysplasia and no evidence of malignancy.     Additional Information 12/18/2020                      Value:All reported additional testing was performed with appropriately reactive                           controls.  These tests were developed and their performance                           characteristics determined by [facility] or                           appropriate performing facility, though some tests may be performed on                           tissues which have not been validated for performance characteristics                           (such as staining performed on alcohol exposed cell blocks and decalcified                           tissues).  Results should be interpreted with caution and in the context                           of the patients’ clinical condition. These tests may not be cleared or                           approved by the U.S. Food and Drug Administration, though the FDA has                           determined that such clearance or approval is not necessary. These tests                           are used for clinical purposes and they should not be regarded as                           investigational or for research. This laboratory has been approved by CLIA                           88, designated as a high-complexity laboratory and is qualified to perform                           these tests.                                                    Interpretation performed at [facility], [address] [place] [address]                           [id number]    Synoptic Checklist 12/18/2020                      Value:  (COLON/RECTUM POLYP FORM - GI - All Specimens)                                                                                                                 :    Adenoma(s)      Gross Description 12/18/2020                      Value:                           A. The specimen is received in formalin, labeled with the patient's name                           and hospital number, and is designated \" ascending colon”.  The specimen                           consists of 2 tan flat and elongated soft tissue fragments measuring                           0.3-1.0 cm in greatest dimension.  The specimen is entirely submitted in a                           screened cassette.        " "                                                                          B. The specimen is received in formalin, labeled with the patient's name                           and hospital number, and is designated \"descending colon”.  The specimen                           consists of a single tan soft tissue fragment measuring 0.1 cm in greatest                           dimension.  The specimen is entirely submitted in a screened cassette.                                                    Note: The estimated total formalin fixation time based upon information                           provided by the submitting clinician and the standard processing schedule                           is under 72 hours.                                                    [facility]                                      Radiology Results:   No results found.  Answers submitted by the patient for this visit:  Questionnaire about: Abdominal pain (Submitted on 8/25/2024)  Chief Complaint: Abdominal pain    "

## 2024-09-09 ENCOUNTER — TELEPHONE (OUTPATIENT)
Dept: GASTROENTEROLOGY | Facility: CLINIC | Age: 59
End: 2024-09-09

## 2024-09-09 DIAGNOSIS — K21.9 GASTROESOPHAGEAL REFLUX DISEASE WITHOUT ESOPHAGITIS: Primary | ICD-10-CM

## 2024-09-09 RX ORDER — PANTOPRAZOLE SODIUM 40 MG/1
40 TABLET, DELAYED RELEASE ORAL
Qty: 62 TABLET | Refills: 6 | Status: SHIPPED | OUTPATIENT
Start: 2024-09-09

## 2024-09-23 ENCOUNTER — TELEPHONE (OUTPATIENT)
Age: 59
End: 2024-09-23

## 2024-10-03 DIAGNOSIS — K21.9 GASTROESOPHAGEAL REFLUX DISEASE WITHOUT ESOPHAGITIS: ICD-10-CM

## 2024-10-03 RX ORDER — PANTOPRAZOLE SODIUM 40 MG/1
40 TABLET, DELAYED RELEASE ORAL
Qty: 180 TABLET | Refills: 1 | Status: SHIPPED | OUTPATIENT
Start: 2024-10-03

## 2024-10-04 ENCOUNTER — NURSE TRIAGE (OUTPATIENT)
Age: 59
End: 2024-10-04

## 2024-10-04 ENCOUNTER — TELEPHONE (OUTPATIENT)
Age: 59
End: 2024-10-04

## 2024-10-04 NOTE — TELEPHONE ENCOUNTER
Patients GI provider:  Dr. Correa    Number to return call: [phone]    Reason for call: Pt calling with continued symptoms, meds not working. Transferred to triage nurse.    Scheduled procedure/appointment date if applicable: 10/18/24

## 2024-10-04 NOTE — TELEPHONE ENCOUNTER
Patient states put on pantoprazole for acid reflux - helped but not better.   Maybe 80%.  Deep breaths makes cough.  States if patient jogs down stairs or runs, starts coughing and food is coming into throat.  Patient sleeps on incline but affecting her during day.  Patient unable to exert herself due food regurgitating.  Patient states she feels as if food is not digesting, food coming back up whole.  States stomach is hard and is unable to eat proteins.    .    Reason for Disposition   The patient has regurgitation    Answer Assessment - Initial Assessment Questions  1. Do you have a history of GERD?  yes  2. When did your symptoms start? Please describe your symptoms and how often you experience these symptoms.  Months ago  3. Are you taking any acid reducing medications currently such as: Prilosec (Omeprazole), Protonix (Pantoprazole), Prevacid (Lansoprazole), Nexium (Esomeprazole), Aciphex (Rabeprazole), Dexilant (Dexlansoprazole)?  Pantoprazole  4. Have you tried any OTC acid reducing medications? (If so, which medications have you tried?) Zantac (Ranitidine), Pepcid (Famotidine), Tagamet (Cimetidine) Tums, Rolaids, Maalox, Mylanta.  pepcid  5. What was the outcome of taking the medications which you have for these symptoms?  Better but not great    Protocols used: GI-Gerd-ADULT-[place]

## 2024-10-11 RX ORDER — NAPROXEN SODIUM 220 MG/1
TABLET, FILM COATED ORAL
COMMUNITY

## 2024-10-15 ENCOUNTER — OFFICE VISIT (OUTPATIENT)
Dept: GASTROENTEROLOGY | Facility: CLINIC | Age: 59
End: 2024-10-15
Payer: COMMERCIAL

## 2024-10-15 VITALS
HEIGHT: 67 IN | SYSTOLIC BLOOD PRESSURE: 138 MMHG | BODY MASS INDEX: 24.01 KG/M2 | WEIGHT: 153 LBS | OXYGEN SATURATION: 98 % | DIASTOLIC BLOOD PRESSURE: 90 MMHG | HEART RATE: 81 BPM | TEMPERATURE: 97.5 F

## 2024-10-15 DIAGNOSIS — K21.9 GASTROESOPHAGEAL REFLUX DISEASE WITHOUT ESOPHAGITIS: Primary | ICD-10-CM

## 2024-10-15 PROCEDURE — 99214 OFFICE O/P EST MOD 30 MIN: CPT | Performed by: INTERNAL MEDICINE

## 2024-10-15 RX ORDER — FAMOTIDINE 20 MG/1
20 TABLET, FILM COATED ORAL 2 TIMES DAILY
Qty: 31 TABLET | Refills: 3 | Status: SHIPPED | OUTPATIENT
Start: 2024-10-15

## 2024-10-16 NOTE — PROGRESS NOTES
[facility] - Outpatient Follow-up Note  Meche Lerma 59 y.o. female MRN: [id number]  Encounter: [id number]          ASSESSMENT AND PLAN:      1. Gastroesophageal reflux disease without esophagitis    - famotidine (PEPCID) 20 mg tablet; Take 1 tablet (20 mg total) by mouth 2 (two) times a day  Dispense: 31 tablet; Refill: 3  - EGD; Future  - Bravo pH Monitoring (must also order EGD separate); Future  - Esophageal manometry; Future  -Continue pantoprazole 40 mg p.o. twice daily  -Increase Pepcid to 40 mg at bedtime  -Continue to sleep upright since patient the bed to 30 degrees was not beneficial    We had a long discussion on how it appears to be necessary to perform 48-hour pH monitoring as well as esophageal manometry in preparation for possible surgical or endoscopic fundoplication.    ______________________________________________________________________    SUBJECTIVE: Asya returns to the office today stating that taking Pepcid twice daily did not improve her symptoms.  She states that taking pantoprazole twice a day and Pepcid 20 mg at bedtime only provides 50% improvement.  She still experiencing chest pain which is a burning sensation.  Her throat frequently becomes hoarse as a consequence of acid reflux.  She is describing indigestion as well as a bloated abdomen.  Her constipation problem has improved by taking MiraLAX either daily or every other day.  She is now having bowel movements daily.  She states that her coughing is worse.  She tried elevating the back of her bed to 30 degrees but this was not beneficial and in the middle of the night she finds herself experiencing worsening coughing as well as hoarseness.  She states that she is living on eggs and yogurt.  She commented that eating protein such as meats seems to make her symptoms worse.  All the symptoms began in Easter of 2024.       REVIEW OF SYSTEMS IS OTHERWISE NEGATIVE.      Historical Information   History  "reviewed. No pertinent past medical history.  Past Surgical History:   Procedure Laterality Date    BUNIONECTOMY Right     COLONOSCOPY       Social History   Social History     Substance and Sexual Activity   Alcohol Use Yes    Alcohol/week: 1.0 standard drink of alcohol    Types: 1 Glasses of wine per week     Social History     Substance and Sexual Activity   Drug Use Never     Social History     Tobacco Use   Smoking Status Never   Smokeless Tobacco Never     Family History   Problem Relation Age of Onset    Hypertension Mother     Colon polyps Father     Heart disease Father     Colon cancer Paternal Grandmother        Meds/Allergies       Current Outpatient Medications:     Ascorbic Acid (VITAMIN C PO)    Ascorbic Acid (vitamin C) 1000 MG tablet    cyclobenzaprine (FLEXERIL) 5 mg tablet    famotidine (PEPCID) 20 mg tablet    famotidine (PEPCID) 20 mg tablet    FLUoxetine (PROzac) 10 mg capsule    Magnesium 125 MG CAPS    naproxen sodium (Aleve) 220 MG tablet    Omega-3 Fatty Acids (Omega-3 Fish Oil) 1000 MG CAPS    pantoprazole (PROTONIX) 40 mg tablet    polyethylene glycol (MiraLax) 17 GM/SCOOP powder    Xiidra 5 % op solution    No Known Allergies        Objective     Blood pressure 138/90, pulse 81, temperature 97.5 °F (36.4 °C), temperature source Temporal, height 5' 7\" (1.702 m), weight 69.4 kg (153 lb), SpO2 98%. Body mass index is 23.96 kg/m².      PHYSICAL EXAM:      General Appearance:   Alert, cooperative, no distress   HEENT:   Normocephalic, atraumatic, anicteric.     Neck:  Supple, symmetrical, trachea midline   Lungs:   Clear to auscultation bilaterally; no rales, rhonchi or wheezing; respirations unlabored    Heart::   Regular rate and rhythm; no murmur, rub, or gallop.   Abdomen:   Soft, non-tender, non-distended; normal bowel sounds; no masses, no organomegaly    Genitalia:   Deferred    Rectal:   Deferred    Extremities:  No cyanosis, clubbing or edema    Pulses:  2+ and symmetric    Skin:  No " jaundice, rashes, or lesions    Lymph nodes:  No palpable cervical lymphadenopathy        Lab Results:   No visits with results within 1 Day(s) from this visit.   Latest known visit with results is:   Lab Requisition on 12/18/2020   Component Date Value    Case Report 12/18/2020                      Value:Surgical Pathology Report                         Case: [id number]                                   Authorizing Provider:  Tobias Correa DO          Collected:           12/18/2020 0732              Ordering Location:     [facility]      Received:            12/18/2020 [address] [place] [address] Specialty                                                                                  Laboratory                                                                   Pathologist:           Cristal Villalobos MD                                                                  Specimens:   A) - Polyp, Colorectal, ascending colon                                                             B) - Polyp, Colorectal, descending colon                                                   Final Diagnosis 12/18/2020                      Value:A. Colon, Ascending colon, Polypectomy:                          - Tubular adenoma.                          - No high grade dysplasia and no evidence of malignancy.                                                     B. Colon, Descending colon, Polypectomy:                          - Tubular adenoma.                          - No high grade dysplasia and no evidence of malignancy.     Additional Information 12/18/2020                      Value:All reported additional testing was performed with appropriately reactive                           controls.  These tests were developed and their performance                           characteristics determined by [facility] or                           appropriate performing facility, though some tests may be  "performed on                           tissues which have not been validated for performance characteristics                           (such as staining performed on alcohol exposed cell blocks and decalcified                           tissues).  Results should be interpreted with caution and in the context                           of the patients’ clinical condition. These tests may not be cleared or                           approved by the U.S. Food and Drug Administration, though the FDA has                           determined that such clearance or approval is not necessary. These tests                           are used for clinical purposes and they should not be regarded as                           investigational or for research. This laboratory has been approved by CLIA                           88, designated as a high-complexity laboratory and is qualified to perform                           these tests.                                                    Interpretation performed at [facility], [address] [place] [address]                           [id number]    Synoptic Checklist 12/18/2020                      Value:  (COLON/RECTUM POLYP FORM - GI - All Specimens)                                                                                                                 :    Adenoma(s)      Gross Description 12/18/2020                      Value:                           A. The specimen is received in formalin, labeled with the patient's name                           and hospital number, and is designated \" ascending colon”.  The specimen                           consists of 2 tan flat and elongated soft tissue fragments measuring                           0.3-1.0 cm in greatest dimension.  The specimen is entirely submitted in a                           screened cassette.                                                                                  B. The specimen is received in formalin, " "labeled with the patient's name                           and hospital number, and is designated \"descending colon”.  The specimen                           consists of a single tan soft tissue fragment measuring 0.1 cm in greatest                           dimension.  The specimen is entirely submitted in a screened cassette.                                                    Note: The estimated total formalin fixation time based upon information                           provided by the submitting clinician and the standard processing schedule                           is under 72 hours.                                                    [facility]                                      Radiology Results:   No results found.  "

## 2024-10-22 ENCOUNTER — HOSPITAL ENCOUNTER (OUTPATIENT)
Dept: GASTROENTEROLOGY | Facility: HOSPITAL | Age: 59
Setting detail: OUTPATIENT SURGERY
Discharge: HOME | End: 2024-10-22
Attending: INTERNAL MEDICINE
Payer: COMMERCIAL

## 2024-10-22 ENCOUNTER — ANESTHESIA (OUTPATIENT)
Dept: GASTROENTEROLOGY | Facility: HOSPITAL | Age: 59
End: 2024-10-22
Payer: COMMERCIAL

## 2024-10-22 ENCOUNTER — ANESTHESIA EVENT (OUTPATIENT)
Dept: GASTROENTEROLOGY | Facility: HOSPITAL | Age: 59
End: 2024-10-22
Payer: COMMERCIAL

## 2024-10-22 VITALS
TEMPERATURE: 97.4 F | HEART RATE: 60 BPM | OXYGEN SATURATION: 99 % | DIASTOLIC BLOOD PRESSURE: 70 MMHG | SYSTOLIC BLOOD PRESSURE: 110 MMHG | HEIGHT: 67 IN | WEIGHT: 154.32 LBS | RESPIRATION RATE: 16 BRPM | BODY MASS INDEX: 24.22 KG/M2

## 2024-10-22 DIAGNOSIS — K21.9 GASTROESOPHAGEAL REFLUX DISEASE WITHOUT ESOPHAGITIS: ICD-10-CM

## 2024-10-22 PROCEDURE — 43235 EGD DIAGNOSTIC BRUSH WASH: CPT | Performed by: INTERNAL MEDICINE

## 2024-10-22 RX ORDER — SODIUM CHLORIDE, SODIUM LACTATE, POTASSIUM CHLORIDE, CALCIUM CHLORIDE 600; 310; 30; 20 MG/100ML; MG/100ML; MG/100ML; MG/100ML
INJECTION, SOLUTION INTRAVENOUS CONTINUOUS PRN
Status: DISCONTINUED | OUTPATIENT
Start: 2024-10-22 | End: 2024-10-22

## 2024-10-22 RX ORDER — LIDOCAINE HYDROCHLORIDE 20 MG/ML
INJECTION, SOLUTION EPIDURAL; INFILTRATION; INTRACAUDAL; PERINEURAL AS NEEDED
Status: DISCONTINUED | OUTPATIENT
Start: 2024-10-22 | End: 2024-10-22

## 2024-10-22 RX ORDER — PROPOFOL 10 MG/ML
INJECTION, EMULSION INTRAVENOUS AS NEEDED
Status: DISCONTINUED | OUTPATIENT
Start: 2024-10-22 | End: 2024-10-22

## 2024-10-22 RX ADMIN — PROPOFOL 20 MG: 10 INJECTION, EMULSION INTRAVENOUS at 07:28

## 2024-10-22 RX ADMIN — SODIUM CHLORIDE, SODIUM LACTATE, POTASSIUM CHLORIDE, AND CALCIUM CHLORIDE: .6; .31; .03; .02 INJECTION, SOLUTION INTRAVENOUS at 07:00

## 2024-10-22 RX ADMIN — PROPOFOL 20 MG: 10 INJECTION, EMULSION INTRAVENOUS at 07:26

## 2024-10-22 RX ADMIN — PROPOFOL 120 MG: 10 INJECTION, EMULSION INTRAVENOUS at 07:22

## 2024-10-22 RX ADMIN — PROPOFOL 20 MG: 10 INJECTION, EMULSION INTRAVENOUS at 07:24

## 2024-10-22 RX ADMIN — LIDOCAINE HYDROCHLORIDE 60 MG: 20 INJECTION, SOLUTION EPIDURAL; INFILTRATION; INTRACAUDAL; PERINEURAL at 07:19

## 2024-10-22 RX ADMIN — PROPOFOL 20 MG: 10 INJECTION, EMULSION INTRAVENOUS at 07:30

## 2024-10-22 RX ADMIN — LIDOCAINE HYDROCHLORIDE 40 MG: 20 INJECTION, SOLUTION EPIDURAL; INFILTRATION; INTRACAUDAL; PERINEURAL at 07:22

## 2024-10-22 NOTE — ANESTHESIA PREPROCEDURE EVALUATION
Procedure:  EGD  BRAVO PH MONITORING    Relevant Problems   No relevant active problems        Physical Exam    Airway    Mallampati score: II  TM Distance: >3 FB  Neck ROM: full     Dental       Cardiovascular  Rhythm: regular, No weak pulses    Pulmonary   No stridor    Other Findings  post-pubertal.      Anesthesia Plan  ASA Score- 2     Anesthesia Type- IV sedation with anesthesia with ASA Monitors.         Additional Monitors:     Airway Plan:            Plan Factors-    Chart reviewed.   Existing labs reviewed. Patient summary reviewed.                  Induction- intravenous.    Postoperative Plan-         Informed Consent- Anesthetic plan and risks discussed with patient.  I personally reviewed this patient with the CRNA. Discussed and agreed on the Anesthesia Plan with the CRNA..         none

## 2024-10-22 NOTE — H&P
"History and Physical -  Gastroenterology Specialists  Meche Lerma 59 y.o. female MRN: [id number]      HPI: Meche Lerma is a 59 y.o. year old female who presents for evaluation of gastroesophageal reflux disease.      REVIEW OF SYSTEMS: Per the HPI, and otherwise unremarkable.    Historical Information   Past Medical History:   Diagnosis Date    GERD (gastroesophageal reflux disease)      Past Surgical History:   Procedure Laterality Date    BUNIONECTOMY Right     COLONOSCOPY       Social History   Social History     Substance and Sexual Activity   Alcohol Use Yes    Alcohol/week: 1.0 standard drink of alcohol    Types: 1 Glasses of wine per week    Comment: socially     Social History     Substance and Sexual Activity   Drug Use Never     Social History     Tobacco Use   Smoking Status Never   Smokeless Tobacco Never     Family History   Problem Relation Age of Onset    Hypertension Mother     Colon polyps Father     Heart disease Father     Colon cancer Paternal Grandmother        Meds/Allergies     Not in a hospital admission.    No Known Allergies    Objective     Blood pressure 132/84, pulse 79, temperature (!) 97.3 °F (36.3 °C), temperature source Temporal, resp. rate 16, height 5' 7\" (1.702 m), weight 70 kg (154 lb 5.2 oz), SpO2 99%.      PHYSICAL EXAM    Gen: NAD  CV: RRR  CHEST: Clear  ABD: soft, NT/ND  EXT: no edema      ASSESSMENT/PLAN:  This is a 59 y.o. year old female here for EGD with Bravo placement, and she is stable and optimized for her procedure.          "

## 2024-10-22 NOTE — ANESTHESIA POSTPROCEDURE EVALUATION
Post-Op Assessment Note    CV Status:  Stable  Pain Score: 0    Pain management: adequate       Mental Status:  Arousable and sleepy   Hydration Status:  Euvolemic   PONV Controlled:  Controlled   Airway Patency:  Patent     Post Op Vitals Reviewed: Yes    No anethesia notable event occurred.    Staff: CRNA           Last Filed PACU Vitals:  Vitals Value Taken Time   Temp     Pulse 78    /69    Resp 18    SpO2 98% RA        Modified Shailesh:  Activity: 2 (10/22/2024  6:53 AM)  Respiration: 2 (10/22/2024  6:53 AM)  Consciousness: 2 (10/22/2024  6:53 AM)  Oxygen Saturation: 2 (10/22/2024  6:53 AM)

## 2024-10-28 PROCEDURE — 91035 G-ESOPH REFLX TST W/ELECTROD: CPT | Performed by: INTERNAL MEDICINE

## 2024-10-29 ENCOUNTER — TELEPHONE (OUTPATIENT)
Dept: GASTROENTEROLOGY | Facility: CLINIC | Age: 59
End: 2024-10-29

## 2024-10-29 NOTE — TELEPHONE ENCOUNTER
Ph Study Results - As per Dr. Correa ,called pt and gave results. Scheduled pt for f/up on 12/18/24 with Dr. Correa    Assessment:  GERD was identified, however, it did not reach a statistically signficant level throughout the study.  The symptom associated probabilty related to reflux events was 98% with chest pain and 100% with regurgitation.     Plan:   Continue current therapy as prescribed.  Followup in the office to discuss further recommendations

## 2024-11-26 ENCOUNTER — OFFICE VISIT (OUTPATIENT)
Dept: GASTROENTEROLOGY | Facility: CLINIC | Age: 59
End: 2024-11-26
Payer: COMMERCIAL

## 2024-11-26 VITALS
WEIGHT: 152 LBS | BODY MASS INDEX: 23.86 KG/M2 | DIASTOLIC BLOOD PRESSURE: 84 MMHG | HEIGHT: 67 IN | TEMPERATURE: 97.2 F | HEART RATE: 80 BPM | OXYGEN SATURATION: 98 % | SYSTOLIC BLOOD PRESSURE: 117 MMHG

## 2024-11-26 DIAGNOSIS — K21.9 GASTROESOPHAGEAL REFLUX DISEASE WITHOUT ESOPHAGITIS: Primary | ICD-10-CM

## 2024-11-26 PROCEDURE — 99214 OFFICE O/P EST MOD 30 MIN: CPT | Performed by: INTERNAL MEDICINE

## 2024-11-26 RX ORDER — ESOMEPRAZOLE MAGNESIUM 40 MG/1
40 CAPSULE, DELAYED RELEASE ORAL
Qty: 30 CAPSULE | Refills: 3 | Status: SHIPPED | OUTPATIENT
Start: 2024-11-26 | End: 2024-11-26 | Stop reason: CLARIF

## 2024-11-26 RX ORDER — ESOMEPRAZOLE MAGNESIUM 40 MG/1
40 CAPSULE, DELAYED RELEASE ORAL
Qty: 30 CAPSULE | Refills: 6 | Status: SHIPPED | OUTPATIENT
Start: 2024-11-26

## 2024-11-26 NOTE — PROGRESS NOTES
[facility] - Outpatient Follow-up Note  Meche Lerma 59 y.o. female MRN: [id number]  Encounter: [id number]          ASSESSMENT AND PLAN:      1. Gastroesophageal reflux disease without esophagitis (Primary)  - esomeprazole (NexIUM) 40 MG capsule; Take 1 capsule (40 mg total) by mouth daily before breakfast  Dispense: 30 capsule; Refill: 3  -No lying down within 2 hours of snacking or eating  -I told her that each trial of a second proton pump inhibitor is worthwhile.  However, if she experiences the same bad side effects with Nexium as she did with pantoprazole, then I think we have to abandon proton pump inhibitors and consider Vonoprazan (Voquenzna)  -No plans for repeat EGD    ______________________________________________________________________    SUBJECTIVE: Isis returns to the office today with ongoing complaints of symptoms associated with her gastroesophageal reflux disease.  She asked that we review her 48-hour pH study.  I printed a copy of the study for her to take home.  She wanted to correlate the times of her symptoms with the acid reflux events.  We discussed her Terry DeMeester score of 4.8 which does not reflect significant acid reflux.  Her total acid exposure time was 1.3% of the recorded 48 hours.  There were 29 episodes of reflux recorded, 28 of which were upright and 1 was supine.  The longest episode lasted for 5.9 minutes.  There was symptom associated probability of 98.2% relative to chest pain and 100% related to her regurgitation episodes.    She is experiencing some improvement with her famotidine taken as 20 mg twice daily.  She had a significant reaction to pantoprazole with stomach cramping, indigestion, joint pains.  Her principal symptom at the present time is a burning tongue sensation.  She does have some mucus and throat clearing at times.  She states that the blowtorch pain that she was experiencing in her chest has improved.  She states that  "3 to 4 hours after taking a Pepcid the burning tongue will return.  She is willing to try another proton pump inhibitor.      REVIEW OF SYSTEMS IS OTHERWISE NEGATIVE.      Historical Information   Past Medical History:   Diagnosis Date    GERD (gastroesophageal reflux disease)      Past Surgical History:   Procedure Laterality Date    BUNIONECTOMY Right     COLONOSCOPY       Social History   Social History     Substance and Sexual Activity   Alcohol Use Yes    Alcohol/week: 1.0 standard drink of alcohol    Types: 1 Glasses of wine per week    Comment: socially     Social History     Substance and Sexual Activity   Drug Use Never     Social History     Tobacco Use   Smoking Status Never   Smokeless Tobacco Never     Family History   Problem Relation Age of Onset    Hypertension Mother     Colon polyps Father     Heart disease Father     Colon cancer Paternal Grandmother        Meds/Allergies       Current Outpatient Medications:     Ascorbic Acid (VITAMIN C PO)    cyclobenzaprine (FLEXERIL) 5 mg tablet    esomeprazole (NexIUM) 40 MG capsule    famotidine (PEPCID) 20 mg tablet    FLUoxetine (PROzac) 10 mg capsule    Magnesium 125 MG CAPS    naproxen sodium (Aleve) 220 MG tablet    Omega-3 Fatty Acids (Omega-3 Fish Oil) 1000 MG CAPS    polyethylene glycol (MiraLax) 17 GM/SCOOP powder    Xiidra 5 % op solution    pantoprazole (PROTONIX) 40 mg tablet    No Known Allergies        Objective     Blood pressure 117/84, pulse 80, temperature (!) 97.2 °F (36.2 °C), temperature source Temporal, height 5' 7\" (1.702 m), weight 68.9 kg (152 lb), SpO2 98%. Body mass index is 23.81 kg/m².      PHYSICAL EXAM:      General Appearance:   Alert, cooperative, no distress   HEENT:   Normocephalic, atraumatic, anicteric.     Neck:  Supple, symmetrical, trachea midline   Lungs:   Clear to auscultation bilaterally; no rales, rhonchi or wheezing; respirations unlabored    Heart::   Regular rate and rhythm; no murmur, rub, or gallop. "   Abdomen:   Soft, non-tender, non-distended; normal bowel sounds; no masses, no organomegaly    Genitalia:   Deferred    Rectal:   Deferred    Extremities:  No cyanosis, clubbing or edema    Pulses:  2+ and symmetric    Skin:  No jaundice, rashes, or lesions    Lymph nodes:  No palpable cervical lymphadenopathy        Lab Results:   No visits with results within 1 Day(s) from this visit.   Latest known visit with results is:   Lab Requisition on 12/18/2020   Component Date Value    Case Report 12/18/2020                      Value:Surgical Pathology Report                         Case: [id number]                                   Authorizing Provider:  Tobias Correa DO          Collected:           12/18/2020 0732              Ordering Location:     [facility]      Received:            12/18/2020 1506                                     Hospital Special[facility]                                                                                  Laboratory                                                                   Pathologist:           Cristal Villalobos MD                                                                  Specimens:   A) - Polyp, Colorectal, ascending colon                                                             B) - Polyp, Colorectal, descending colon                                                   Final Diagnosis 12/18/2020                      Value:A. Colon, Ascending colon, Polypectomy:                          - Tubular adenoma.                          - No high grade dysplasia and no evidence of malignancy.                                                     B. Colon, Descending colon, Polypectomy:                          - Tubular adenoma.                          - No high grade dysplasia and no evidence of malignancy.     Additional Information 12/18/2020                      Value:All reported additional testing was performed with appropriately reactive                            "controls.  These tests were developed and their performance                           characteristics determined by [facility] or                           appropriate performing facility, though some tests may be performed on                           tissues which have not been validated for performance characteristics                           (such as staining performed on alcohol exposed cell blocks and decalcified                           tissues).  Results should be interpreted with caution and in the context                           of the patients’ clinical condition. These tests may not be cleared or                           approved by the U.S. Food and Drug Administration, though the FDA has                           determined that such clearance or approval is not necessary. These tests                           are used for clinical purposes and they should not be regarded as                           investigational or for research. This laboratory has been approved by [place]                           88, designated as a high-complexity laboratory and is qualified to perform                           these tests.                                                    Interpretation performed at [facility], [facility]                           [id number]    Synoptic Checklist 12/18/2020                      Value:  (COLON/RECTUM POLYP FORM - GI - All Specimens)                                                                                                                 :    Adenoma(s)      Gross Description 12/18/2020                      Value:                           A. The specimen is received in formalin, labeled with the patient's name                           and hospital number, and is designated \" ascending colon”.  The specimen                           consists of 2 tan flat and elongated soft tissue fragments measuring                           " "0.3-1.0 cm in greatest dimension.  The specimen is entirely submitted in a                           screened cassette.                                                                                  B. The specimen is received in formalin, labeled with the patient's name                           and hospital number, and is designated \"descending colon”.  The specimen                           consists of a single tan soft tissue fragment measuring 0.1 cm in greatest                           dimension.  The specimen is entirely submitted in a screened cassette.                                                    Note: The estimated total formalin fixation time based upon information                           provided by the submitting clinician and the standard processing schedule                           is under 72 hours.                                                    Wilma                                      Radiology Results:   No results found.  "

## 2024-12-16 DIAGNOSIS — K21.9 GASTROESOPHAGEAL REFLUX DISEASE WITHOUT ESOPHAGITIS: ICD-10-CM

## 2024-12-17 RX ORDER — ESOMEPRAZOLE MAGNESIUM 40 MG/1
40 CAPSULE, DELAYED RELEASE ORAL
Qty: 30 CAPSULE | Refills: 0 | Status: SHIPPED | OUTPATIENT
Start: 2024-12-17

## 2024-12-19 ENCOUNTER — TREATMENT (OUTPATIENT)
Dept: GASTROENTEROLOGY | Facility: CLINIC | Age: 59
End: 2024-12-19

## 2024-12-19 DIAGNOSIS — K21.9 GASTROESOPHAGEAL REFLUX DISEASE WITHOUT ESOPHAGITIS: Primary | ICD-10-CM

## 2024-12-20 ENCOUNTER — TELEPHONE (OUTPATIENT)
Dept: GASTROENTEROLOGY | Facility: CLINIC | Age: 59
End: 2024-12-20

## 2024-12-20 NOTE — TELEPHONE ENCOUNTER
prior auth is needed for     Vonoprazan Fumarate 20 MG TABS [[id number]]    Order Details  Dose: 20 mg Route: Oral Frequency: Daily   Dispense Quantity: 31 tablet Refills: 6          Sig: Take 20 mg by mouth in the morning   Thanks

## 2025-01-10 ENCOUNTER — APPOINTMENT (EMERGENCY)
Dept: CT IMAGING | Facility: HOSPITAL | Age: 60
End: 2025-01-10
Payer: COMMERCIAL

## 2025-01-10 ENCOUNTER — HOSPITAL ENCOUNTER (INPATIENT)
Facility: HOSPITAL | Age: 60
LOS: 3 days | Discharge: HOME | End: 2025-01-13
Attending: EMERGENCY MEDICINE | Admitting: INTERNAL MEDICINE
Payer: COMMERCIAL

## 2025-01-10 DIAGNOSIS — R63.0 LOSS OF APPETITE: ICD-10-CM

## 2025-01-10 DIAGNOSIS — K57.92 ACUTE DIVERTICULITIS: ICD-10-CM

## 2025-01-10 DIAGNOSIS — K57.32 SIGMOID DIVERTICULITIS: Primary | ICD-10-CM

## 2025-01-10 DIAGNOSIS — R14.0 BLOATING: ICD-10-CM

## 2025-01-10 PROBLEM — A41.9 SEPSIS DUE TO UNDETERMINED ORGANISM (HCC): Status: ACTIVE | Noted: 2025-01-10

## 2025-01-10 PROBLEM — F41.9 ANXIETY: Status: ACTIVE | Noted: 2025-01-10

## 2025-01-10 PROBLEM — Z86.0100 HISTORY OF COLONIC POLYPS: Status: ACTIVE | Noted: 2025-01-10

## 2025-01-10 LAB
ALBUMIN SERPL BCG-MCNC: 4.2 G/DL (ref 3.5–5)
ALP SERPL-CCNC: 58 U/L (ref 34–104)
ALT SERPL W P-5'-P-CCNC: 6 U/L (ref 7–52)
ANION GAP SERPL CALCULATED.3IONS-SCNC: 10 MMOL/L (ref 4–13)
AST SERPL W P-5'-P-CCNC: 12 U/L (ref 13–39)
BACTERIA UR QL AUTO: NORMAL /HPF
BASOPHILS # BLD AUTO: 0.08 THOUSANDS/ΜL (ref 0–0.1)
BASOPHILS NFR BLD AUTO: 1 % (ref 0–1)
BILIRUB SERPL-MCNC: 0.7 MG/DL (ref 0.2–1)
BILIRUB UR QL STRIP: NEGATIVE
BUN SERPL-MCNC: 16 MG/DL (ref 5–25)
CALCIUM SERPL-MCNC: 9.1 MG/DL (ref 8.4–10.2)
CHLORIDE SERPL-SCNC: 102 MMOL/L (ref 96–108)
CLARITY UR: CLEAR
CO2 SERPL-SCNC: 25 MMOL/L (ref 21–32)
COLOR UR: COLORLESS
CREAT SERPL-MCNC: 1.03 MG/DL (ref 0.6–1.3)
EOSINOPHIL # BLD AUTO: 0.06 THOUSAND/ΜL (ref 0–0.61)
EOSINOPHIL NFR BLD AUTO: 0 % (ref 0–6)
ERYTHROCYTE [DISTWIDTH] IN BLOOD BY AUTOMATED COUNT: 11.6 % (ref 11.6–15.1)
GFR SERPL CREATININE-BSD FRML MDRD: 60 ML/MIN/1.73SQ M
GLUCOSE SERPL-MCNC: 113 MG/DL (ref 65–140)
GLUCOSE UR STRIP-MCNC: NEGATIVE MG/DL
HCT VFR BLD AUTO: 42.8 % (ref 34.8–46.1)
HGB BLD-MCNC: 14.3 G/DL (ref 11.5–15.4)
HGB UR QL STRIP.AUTO: ABNORMAL
IMM GRANULOCYTES # BLD AUTO: 0.08 THOUSAND/UL (ref 0–0.2)
IMM GRANULOCYTES NFR BLD AUTO: 1 % (ref 0–2)
KETONES UR STRIP-MCNC: NEGATIVE MG/DL
LACTATE SERPL-SCNC: 0.7 MMOL/L (ref 0.5–2)
LEUKOCYTE ESTERASE UR QL STRIP: NEGATIVE
LIPASE SERPL-CCNC: 15 U/L (ref 11–82)
LYMPHOCYTES # BLD AUTO: 3.05 THOUSANDS/ΜL (ref 0.6–4.47)
LYMPHOCYTES NFR BLD AUTO: 17 % (ref 14–44)
MCH RBC QN AUTO: 30.6 PG (ref 26.8–34.3)
MCHC RBC AUTO-ENTMCNC: 33.4 G/DL (ref 31.4–37.4)
MCV RBC AUTO: 92 FL (ref 82–98)
MONOCYTES # BLD AUTO: 1.86 THOUSAND/ΜL (ref 0.17–1.22)
MONOCYTES NFR BLD AUTO: 11 % (ref 4–12)
NEUTROPHILS # BLD AUTO: 12.43 THOUSANDS/ΜL (ref 1.85–7.62)
NEUTS SEG NFR BLD AUTO: 70 % (ref 43–75)
NITRITE UR QL STRIP: NEGATIVE
NON-SQ EPI CELLS URNS QL MICRO: NORMAL /HPF
NRBC BLD AUTO-RTO: 0 /100 WBCS
PH UR STRIP.AUTO: 6.5 [PH]
PLATELET # BLD AUTO: 355 THOUSANDS/UL (ref 149–390)
PMV BLD AUTO: 10.1 FL (ref 8.9–12.7)
POTASSIUM SERPL-SCNC: 3.5 MMOL/L (ref 3.5–5.3)
PROCALCITONIN SERPL-MCNC: <0.05 NG/ML
PROT SERPL-MCNC: 7.3 G/DL (ref 6.4–8.4)
PROT UR STRIP-MCNC: NEGATIVE MG/DL
RBC # BLD AUTO: 4.68 MILLION/UL (ref 3.81–5.12)
RBC #/AREA URNS AUTO: NORMAL /HPF
SODIUM SERPL-SCNC: 137 MMOL/L (ref 135–147)
SP GR UR STRIP.AUTO: >=1.05 (ref 1–1.03)
UROBILINOGEN UR STRIP-ACNC: <2 MG/DL
WBC # BLD AUTO: 17.56 THOUSAND/UL (ref 4.31–10.16)
WBC #/AREA URNS AUTO: NORMAL /HPF

## 2025-01-10 PROCEDURE — 80053 COMPREHEN METABOLIC PANEL: CPT | Performed by: EMERGENCY MEDICINE

## 2025-01-10 PROCEDURE — 74177 CT ABD & PELVIS W/CONTRAST: CPT

## 2025-01-10 PROCEDURE — 99223 1ST HOSP IP/OBS HIGH 75: CPT | Performed by: INTERNAL MEDICINE

## 2025-01-10 PROCEDURE — 83605 ASSAY OF LACTIC ACID: CPT | Performed by: EMERGENCY MEDICINE

## 2025-01-10 PROCEDURE — 87505 NFCT AGENT DETECTION GI: CPT | Performed by: INTERNAL MEDICINE

## 2025-01-10 PROCEDURE — 99284 EMERGENCY DEPT VISIT MOD MDM: CPT

## 2025-01-10 PROCEDURE — 84145 PROCALCITONIN (PCT): CPT | Performed by: INTERNAL MEDICINE

## 2025-01-10 PROCEDURE — 96360 HYDRATION IV INFUSION INIT: CPT

## 2025-01-10 PROCEDURE — 85025 COMPLETE CBC W/AUTO DIFF WBC: CPT | Performed by: EMERGENCY MEDICINE

## 2025-01-10 PROCEDURE — 99285 EMERGENCY DEPT VISIT HI MDM: CPT | Performed by: EMERGENCY MEDICINE

## 2025-01-10 PROCEDURE — 87040 BLOOD CULTURE FOR BACTERIA: CPT | Performed by: EMERGENCY MEDICINE

## 2025-01-10 PROCEDURE — 36415 COLL VENOUS BLD VENIPUNCTURE: CPT | Performed by: EMERGENCY MEDICINE

## 2025-01-10 PROCEDURE — 83690 ASSAY OF LIPASE: CPT | Performed by: EMERGENCY MEDICINE

## 2025-01-10 PROCEDURE — 81001 URINALYSIS AUTO W/SCOPE: CPT | Performed by: INTERNAL MEDICINE

## 2025-01-10 RX ORDER — ONDANSETRON 2 MG/ML
4 INJECTION INTRAMUSCULAR; INTRAVENOUS EVERY 6 HOURS PRN
Status: DISCONTINUED | OUTPATIENT
Start: 2025-01-10 | End: 2025-01-13 | Stop reason: HOSPADM

## 2025-01-10 RX ORDER — ACETAMINOPHEN 10 MG/ML
1000 INJECTION, SOLUTION INTRAVENOUS ONCE
Status: COMPLETED | OUTPATIENT
Start: 2025-01-10 | End: 2025-01-10

## 2025-01-10 RX ORDER — CALCIUM CARBONATE 500 MG/1
1000 TABLET, CHEWABLE ORAL DAILY PRN
Status: DISCONTINUED | OUTPATIENT
Start: 2025-01-10 | End: 2025-01-13 | Stop reason: HOSPADM

## 2025-01-10 RX ORDER — ACETAMINOPHEN 325 MG/1
650 TABLET ORAL EVERY 6 HOURS PRN
Status: DISCONTINUED | OUTPATIENT
Start: 2025-01-10 | End: 2025-01-13 | Stop reason: HOSPADM

## 2025-01-10 RX ORDER — POLYETHYLENE GLYCOL 3350 17 G/17G
17 POWDER, FOR SOLUTION ORAL DAILY
Status: DISCONTINUED | OUTPATIENT
Start: 2025-01-11 | End: 2025-01-11

## 2025-01-10 RX ORDER — METRONIDAZOLE 500 MG/100ML
500 INJECTION, SOLUTION INTRAVENOUS ONCE
Status: DISCONTINUED | OUTPATIENT
Start: 2025-01-10 | End: 2025-01-10

## 2025-01-10 RX ORDER — METRONIDAZOLE 500 MG/100ML
500 INJECTION, SOLUTION INTRAVENOUS EVERY 8 HOURS
Status: DISCONTINUED | OUTPATIENT
Start: 2025-01-10 | End: 2025-01-13 | Stop reason: HOSPADM

## 2025-01-10 RX ORDER — SIMETHICONE 80 MG
80 TABLET,CHEWABLE ORAL
Status: DISCONTINUED | OUTPATIENT
Start: 2025-01-10 | End: 2025-01-13 | Stop reason: HOSPADM

## 2025-01-10 RX ORDER — SODIUM CHLORIDE 9 MG/ML
125 INJECTION, SOLUTION INTRAVENOUS CONTINUOUS
Status: DISCONTINUED | OUTPATIENT
Start: 2025-01-10 | End: 2025-01-13

## 2025-01-10 RX ORDER — FLUOXETINE 10 MG/1
10 CAPSULE ORAL DAILY
Status: DISCONTINUED | OUTPATIENT
Start: 2025-01-11 | End: 2025-01-13 | Stop reason: HOSPADM

## 2025-01-10 RX ADMIN — SODIUM CHLORIDE 1000 ML: 0.9 INJECTION, SOLUTION INTRAVENOUS at 19:31

## 2025-01-10 RX ADMIN — METRONIDAZOLE 500 MG: 500 INJECTION, SOLUTION INTRAVENOUS at 17:21

## 2025-01-10 RX ADMIN — SODIUM CHLORIDE 1000 ML: 0.9 INJECTION, SOLUTION INTRAVENOUS at 14:34

## 2025-01-10 RX ADMIN — CEFTRIAXONE SODIUM 1000 MG: 10 INJECTION, POWDER, FOR SOLUTION INTRAVENOUS at 16:38

## 2025-01-10 RX ADMIN — SODIUM CHLORIDE 125 ML/HR: 0.9 INJECTION, SOLUTION INTRAVENOUS at 17:00

## 2025-01-10 RX ADMIN — ACETAMINOPHEN 1000 MG: 10 INJECTION INTRAVENOUS at 16:37

## 2025-01-10 RX ADMIN — SIMETHICONE 80 MG: 80 TABLET, CHEWABLE ORAL at 16:57

## 2025-01-10 RX ADMIN — IOHEXOL 80 ML: 350 INJECTION, SOLUTION INTRAVENOUS at 15:10

## 2025-01-10 RX ADMIN — SIMETHICONE 80 MG: 80 TABLET, CHEWABLE ORAL at 21:44

## 2025-01-10 NOTE — ASSESSMENT & PLAN NOTE
Leukocytosis WBC 17.5, heart rate 115.  Lactic acid 0.7. source of diverticulitis  Continue antibiotics  Received fluids in ED

## 2025-01-10 NOTE — ED PROVIDER NOTES
Time reflects when diagnosis was documented in both MDM as applicable and the Disposition within this note       Time User Action Codes Description Comment    1/10/2025  4:30 PM Simi Farley Add [K57.32] Sigmoid diverticulitis     1/10/2025  4:30 PM Simi Farley Add [R63.0] Loss of appetite           ED Disposition       ED Disposition   Admit    Condition   Stable    Date/Time   Fri Tulio 10, 2025  4:30 PM    Comment   Case was discussed with Dr. Stokes and the patient's admission status was agreed to be Admission Status: inpatient status to the service of Dr. Stokes.               Assessment & Plan       Medical Decision Making  Amount and/or Complexity of Data Reviewed  Labs: ordered.  Radiology: ordered.    Risk  Prescription drug management.  Decision regarding hospitalization.        ED Course as of 01/10/25 1744   Fri Tulio 10, 2025   1410 Declines analgesic.  Intense abdominal bloating and some reflux over the last year.  Reflux recently has been very mild.  Lower abdominal bloating has markedly worsened over the last week and a half with development of new severe by lateral pelvic cramping/tenderness-maximal left lower quadrant.  Stools had loosened over this time.  She reports some mucus present in them although that not being a new feature.  Due to degree of discomfort appetite and intake have been reduced; she has had unintentional but explained weight loss.    Differential diagnosis includes but is not limited to diverticulitis +/- perforation, colitis, colonic or alternate intra-abdominal mass, partial bowel obstruction, ovarian cyst +/- torsion, ureterolithiasis with ileus, UTI.   1522 Marked leukocytosis.  Chemistry and renal function normal.  LFTs not elevated.   1535 Patient updated on findings of lab work.  I have additionally reviewed CT images and appreciate marked inflammation in the pelvic region.  With combination of intense pain, leukocytosis and these  inflammatory findings covering empirically with antibiotics with concern for diverticulitis/colitis.  She is aware radiologist will review images and much further detail.  I will apprise her on findings upon their result.  She remains n.p.o. at this time.   1544 CT findings consistent with uncomplicated diverticulitis.  Will discuss disposition options of inpatient treatment with IV medications until oral intake/tolerance improves versus discharge home with close follow-up and return as needed worsening.   1551 Patient updated on findings.  She wishes to try drinking fluids here to see how she feels in selecting inpatient versus outpatient treatment.  She notes that in the past taking antibiotics on an empty stomach has been quite problematic for her.   1624 Patient had return of very intense pain in the lower abdomen which is small sips of water.  Receptive to acetaminophen at this time.  Will bring in for treatment with IV antibiotics.       Medications   metroNIDAZOLE (FLAGYL) IVPB (premix) 500 mg 100 mL (500 mg Intravenous New Bag 1/10/25 1721)   sodium chloride 0.9 % infusion (125 mL/hr Intravenous New Bag 1/10/25 1700)   simethicone (MYLICON) chewable tablet 80 mg (80 mg Oral Given 1/10/25 1657)   sodium chloride 0.9 % bolus 1,000 mL (0 mL Intravenous Stopped 1/10/25 1552)   iohexol (OMNIPAQUE) 350 MG/ML injection (MULTI-DOSE) 80 mL (80 mL Intravenous Given 1/10/25 1510)   ceftriaxone (ROCEPHIN) 1 g/50 mL in dextrose IVPB (0 mg Intravenous Stopped 1/10/25 1708)   acetaminophen (Ofirmev) injection 1,000 mg (0 mg Intravenous Stopped 1/10/25 1652)       ED Risk Strat Scores                                              History of Present Illness       Chief Complaint   Patient presents with    Abdominal Pain     Pt reports bloating and abd pain. Since January 1st. Poor appetite. Bloat has been going on for month sand has been seen by gastro for it several times.        Past Medical History:   Diagnosis Date     "GERD (gastroesophageal reflux disease)       Past Surgical History:   Procedure Laterality Date    BUNIONECTOMY Right     COLONOSCOPY        Family History   Problem Relation Age of Onset    Hypertension Mother     Colon polyps Father     Heart disease Father     Colon cancer Paternal Grandmother       Social History     Tobacco Use    Smoking status: Never    Smokeless tobacco: Never   Substance Use Topics    Alcohol use: Yes     Alcohol/week: 1.0 standard drink of alcohol     Types: 1 Glasses of wine per week     Comment: socially    Drug use: Never      E-Cigarette/Vaping      E-Cigarette/Vaping Substances      I have reviewed and agree with the history as documented.     Patient is a 59-year-old female presents to the emergency department for evaluation with abdominal discomfort.  She explains that over the last 1 year she has been having \"severe bloating and reflux.\"  She has been under the care of of gastroenterology with diagnosis of GERD.  She has trialed numerous PPIs-each of which led to a side effect.  She is currently off of PPIs though relates that at this point the GERD has been mild and is the least of her concerns.  Since January 1, 2025 she has had \"severe cramps.\"  She gestures to the bilateral pelvic region as site of discomfort.  At times these are so intense she needs to crawl into the fetal position.  On other occasions when completely still discomfort is milder.  Any oral intake including sips of water exacerbates the cramping.  As a result she has had decreased appetite, intake and associated weight loss.  She does feel dehydrated.  Over this time she describes a feeling of \"with constipation and diarrhea.\"  At times she has urge to defecate passes only a small amount of loose nonbloody stool.  She does occasionally have mucus with this though the mucoid component is not new.  She relates that she has undergone SIBO testing in the past which was normal.  She has not taken anything for " discomfort noting that at points in the past she overtook acetaminophen and ibuprofen.  No alcohol use, recent antibiotic use, travel, known contacts with same, family history with intestinal or other pelvic problems.    She does have a history of ovarian cysts for which no specific intervention was required.  No history of abdominal surgeries.  She has been postmenopausal for approximately 5 years.  No unusual vaginal discharge or spotting.  Urination is decreased on account of decreased fluid consumption.  No urgency, dysuria or hematuria.  No flank pain.              Review of Systems   All other systems reviewed and are negative.          Objective       ED Triage Vitals [01/10/25 1327]   Temperature Pulse Blood Pressure Respirations SpO2 Patient Position - Orthostatic VS   98.8 °F (37.1 °C) (!) 115 126/81 18 98 % Sitting      Temp Source Heart Rate Source BP Location FiO2 (%) Pain Score    Oral Monitor Right arm -- --      Vitals      Date and Time Temp Pulse SpO2 Resp BP Pain Score FACES Pain Rating User   01/10/25 1518 -- 74 100 % 18 121/76 -- -- RC   01/10/25 1327 98.8 °F (37.1 °C) 115 98 % 18 126/81 -- -- EM            Physical Exam  Vitals and nursing note reviewed.   Constitutional:       General: She is in acute distress.      Appearance: She is well-developed.      Comments: Very uncomfortable appearing and cautious with movements.   HENT:      Head: Normocephalic.   Cardiovascular:      Rate and Rhythm: Normal rate and regular rhythm.   Pulmonary:      Effort: Pulmonary effort is normal.      Breath sounds: Normal breath sounds.   Abdominal:      General: Bowel sounds are increased. There is distension (Tympanic).      Palpations: Abdomen is soft.      Tenderness: There is no abdominal tenderness (Across the entire lower abdomen-maximal in the left lower quadrant where guarding is present.  Upper abdomen is nontender to deep palpation). There is guarding (LLq). There is no right CVA tenderness or left  CVA tenderness.   Skin:     General: Skin is warm and dry.   Neurological:      Mental Status: She is alert and oriented to person, place, and time.   Psychiatric:         Mood and Affect: Mood normal.         Behavior: Behavior normal.         Results Reviewed       Procedure Component Value Units Date/Time    Blood culture #1 [[id number]] Collected: 01/10/25 1633    Lab Status: In process Specimen: Blood from Arm, Right Updated: 01/10/25 1637    Blood culture #2 [[id number]] Collected: 01/10/25 1619    Lab Status: In process Specimen: Blood from Arm, Right Updated: 01/10/25 1623    Comprehensive metabolic panel [[id number]]  (Abnormal) Collected: 01/10/25 1431    Lab Status: Final result Specimen: Blood from Arm, Left Updated: 01/10/25 1501     Sodium 137 mmol/L      Potassium 3.5 mmol/L      Chloride 102 mmol/L      CO2 25 mmol/L      ANION GAP 10 mmol/L      BUN 16 mg/dL      Creatinine 1.03 mg/dL      Glucose 113 mg/dL      Calcium 9.1 mg/dL      AST 12 U/L      ALT 6 U/L      Alkaline Phosphatase 58 U/L      Total Protein 7.3 g/dL      Albumin 4.2 g/dL      Total Bilirubin 0.70 mg/dL      eGFR 60 ml/min/1.73sq m     Narrative:      [employer] guidelines for Chronic Kidney Disease (CKD):     Stage 1 with normal or high GFR (GFR > 90 mL/min/1.73 square meters)    Stage 2 Mild CKD (GFR = 60-89 mL/min/1.73 square meters)    Stage 3A Moderate CKD (GFR = 45-59 mL/min/1.73 square meters)    Stage 3B Moderate CKD (GFR = 30-44 mL/min/1.73 square meters)    Stage 4 Severe CKD (GFR = 15-29 mL/min/1.73 square meters)    Stage 5 End Stage CKD (GFR <15 mL/min/1.73 square meters)  Note: GFR calculation is accurate only with a steady state creatinine    Lipase [[id number]]  (Normal) Collected: 01/10/25 1431    Lab Status: Final result Specimen: Blood from Arm, Left Updated: 01/10/25 1501     Lipase 15 u/L     Lactic acid, plasma (w/reflex if result > 2.0) [[id number]]  (Normal) Collected: 01/10/25 1431     Lab Status: Final result Specimen: Blood from Arm, Left Updated: 01/10/25 1500     LACTIC ACID 0.7 mmol/L     Narrative:      Result may be elevated if tourniquet was used during collection.    CBC and differential [[id number]]  (Abnormal) Collected: 01/10/25 1431    Lab Status: Final result Specimen: Blood from Arm, Left Updated: 01/10/25 1450     WBC 17.56 Thousand/uL      RBC 4.68 Million/uL      Hemoglobin 14.3 g/dL      Hematocrit 42.8 %      MCV 92 fL      MCH 30.6 pg      MCHC 33.4 g/dL      RDW 11.6 %      MPV 10.1 fL      Platelets 355 Thousands/uL      nRBC 0 /100 WBCs      Segmented % 70 %      Immature Grans % 1 %      Lymphocytes % 17 %      Monocytes % 11 %      Eosinophils Relative 0 %      Basophils Relative 1 %      Absolute Neutrophils 12.43 Thousands/µL      Absolute Immature Grans 0.08 Thousand/uL      Absolute Lymphocytes 3.05 Thousands/µL      Absolute Monocytes 1.86 Thousand/µL      Eosinophils Absolute 0.06 Thousand/µL      Basophils Absolute 0.08 Thousands/µL     Clostridium difficile toxin by PCR with EIA [[id number]]     Lab Status: No result Specimen: Stool     Stool Enteric Bacterial Panel by PCR [[id number]]     Lab Status: No result Specimen: Stool     UA w Reflex to Microscopic w Reflex to Culture [[id number]]     Lab Status: No result Specimen: Urine             CT abdomen pelvis with contrast   Final Interpretation by Timi Monzon DO (01/10 1540)      Acute uncomplicated sigmoid diverticulitis. No abscess or free air. Small volume of simple pelvic free fluid noted.      The study was marked in EPIC for immediate notification.         Workstation performed: [id number]             CriticalCare Time    Date/Time: 1/10/2025 5:43 PM    Performed by: Simi Farley MD  Authorized by: Simi Farley MD    Critical care provider statement:     Critical care time (minutes):  30    Critical care time was exclusive of:  Separately billable procedures and  treating other patients and teaching time    Critical care was necessary to treat or prevent imminent or life-threatening deterioration of the following conditions:  Sepsis    Critical care was time spent personally by me on the following activities:  Obtaining history from patient or surrogate, examination of patient, review of old charts, ordering and performing treatments and interventions, ordering and review of laboratory studies, ordering and review of radiographic studies, re-evaluation of patient's condition, evaluation of patient's response to treatment, development of treatment plan with patient or surrogate and discussions with consultants      ED Medication and Procedure Management   Prior to Admission Medications   Prescriptions Last Dose Informant Patient Reported? Taking?   Ascorbic Acid (VITAMIN C PO)  Self Yes No   FLUoxetine (PROzac) 10 mg capsule  Self Yes No   Sig: Take 10 mg by mouth daily   Magnesium 125 MG CAPS  Self Yes No   Omega-3 Fatty Acids (Omega-3 Fish Oil) 1000 MG CAPS  Self Yes No   Sig: Fish Oil   Vonoprazan Fumarate 20 MG TABS   No No   Sig: Take 20 mg by mouth in the morning   Xiidra 5 % op solution  Self Yes No   cyclobenzaprine (FLEXERIL) 5 mg tablet  Self Yes No   Sig: Take 5 mg by mouth 3 (three) times a day as needed for muscle spasms   esomeprazole (NexIUM) 40 MG capsule   No No   Sig: Take 1 capsule (40 mg total) by mouth daily before breakfast   famotidine (PEPCID) 20 mg tablet   No No   Sig: Take 1 tablet (20 mg total) by mouth 2 (two) times a day   naproxen sodium (Aleve) 220 MG tablet  Self Yes No   Sig: Take 1 tablet every 12 hours by oral route as needed.   Patient taking differently: As needed   pantoprazole (PROTONIX) 40 mg tablet  Self No No   Sig: TAKE 1 TABLET BY MOUTH 2 TIMES A DAY BEFORE MEALS.   Patient not taking: Reported on 11/26/2024   polyethylene glycol (MiraLax) 17 GM/SCOOP powder  Self Yes No      Facility-Administered Medications: None     Patient's  Medications   Discharge Prescriptions    No medications on file     No discharge procedures on file.  ED SEPSIS DOCUMENTATION   Time reflects when diagnosis was documented in both MDM as applicable and the Disposition within this note       Time User Action Codes Description Comment    1/10/2025  4:30 PM Simi Farley Add [K57.32] Sigmoid diverticulitis     1/10/2025  4:30 PM Simi Farley Add [R63.0] Loss of appetite            Initial Sepsis Screening       Row Name 01/10/25 3657                Is the patient's history suggestive of a new or worsening infection? Yes (Proceed)  -SZ        Suspected source of infection acute abdominal infection  -SZ        Indicate SIRS criteria Tachycardia > 90 bpm;Leukocytosis (WBC > 58895 IJL) OR Leukopenia (WBC <4000 IJL) OR Bandemia (WBC >10% bands)  -SZ        Are two or more of the above signs & symptoms of infection both present and new to the patient? Yes (Proceed)  -SZ        Assess for evidence of organ dysfunction: Are any of the below criteria present within 6 hours of suspected infection and SIRS criteria that are NOT considered to be chronic conditions? --  NO  -SZ                  User Key  (r) = Recorded By, (t) = Taken By, (c) = Cosigned By      Initials Name Provider Type    TOSHIA Farley MD Physician                       Simi Farley MD  01/10/25 4715

## 2025-01-10 NOTE — ASSESSMENT & PLAN NOTE
Abdominal pain since 1/1/2025 .no fever chills   acute uncomplicated sigmoid diverticulitis noted on imaging.  Received ceftriaxone and Flagyl in ED  Will continue ceftriaxone and Flagyl  Clear liquid diet today  Pain control

## 2025-01-10 NOTE — H&P
H&P - Hospitalist   Name: Meche Lerma 59 y.o. female I MRN: [id number]  Unit/Bed#: ED-06 I Date of Admission: 1/10/2025   Date of Service: 1/10/2025 I Hospital Day: 0     Assessment & Plan  Sepsis due to undetermined organism (HCC)  Leukocytosis WBC 17.5, heart rate 115.  Lactic acid 0.7. source of diverticulitis  Continue antibiotics  Received fluids in ED  Acute diverticulitis  Abdominal pain since 1/1/2025 .no fever chills   acute uncomplicated sigmoid diverticulitis noted on imaging.  Received ceftriaxone and Flagyl in ED  Will continue ceftriaxone and Flagyl  Clear liquid diet today  Pain control  Anxiety  Continue fluoxetine  History of colonic polyps  History of colonic polyps status post excision.  Follows up with GI Dr. Pelayo      VTE Pharmacologic Prophylaxis: VTE Score: 2 Low Risk (Score 0-2) - Encourage Ambulation.  Code Status: No Order full code  Discussion with family: Updated  () at bedside.    Anticipated Length of Stay: Patient will be admitted on an inpatient basis with an anticipated length of stay of greater than 2 midnights secondary to IV antibiotics.    History of Present Illness   Chief Complaint:     Meche Lerma is a 59 y.o. female with a PMH of anxiety, abdominal bloating, history of colonic polyps, who presents with left lower quadrant abdominal pain since January 1, 2025.  Patient symptoms have been getting worse.  Whenever patient eats she gets intense pain in the left lower quadrant.  Denies any fever chills or rigor.  No similar events in the past.  Last colonoscopy was in 2020.    Review of Systems  12 point review systems negative except noted above  Historical Information   Past Medical History:   Diagnosis Date    GERD (gastroesophageal reflux disease)      Past Surgical History:   Procedure Laterality Date    BUNIONECTOMY Right     COLONOSCOPY       Social History     Tobacco Use    Smoking status: Never    Smokeless tobacco: Never   Substance and  Sexual Activity    Alcohol use: Yes     Alcohol/week: 1.0 standard drink of alcohol     Types: 1 Glasses of wine per week     Comment: socially    Drug use: Never    Sexual activity: Not on file     E-Cigarette/Vaping     E-Cigarette/Vaping Substances     Family history non-contributory  Social History:  Marital Status: /Civil Union   Occupation:   Patient Pre-hospital Living Situation: Home  Patient Pre-hospital Level of Mobility: walks  Patient Pre-hospital Diet Restrictions:     Meds/Allergies   I have reviewed home medications with patient personally.  Prior to Admission medications    Medication Sig Start Date End Date Taking? Authorizing Provider   Ascorbic Acid (VITAMIN C PO)     Historical Provider, MD   cyclobenzaprine (FLEXERIL) 5 mg tablet Take 5 mg by mouth 3 (three) times a day as needed for muscle spasms    Historical Provider, MD   esomeprazole (NexIUM) 40 MG capsule Take 1 capsule (40 mg total) by mouth daily before breakfast 12/17/24   Belinda Younger PA-C   famotidine (PEPCID) 20 mg tablet Take 1 tablet (20 mg total) by mouth 2 (two) times a day 10/15/24   Tobias Correa DO   FLUoxetine (PROzac) 10 mg capsule Take 10 mg by mouth daily 5/22/23   Historical Provider, MD   Magnesium 125 MG CAPS  8/1/20   Historical Provider, MD   naproxen sodium (Aleve) 220 MG tablet Take 1 tablet every 12 hours by oral route as needed.  Patient taking differently: As needed    Historical Provider, MD   Omega-3 Fatty Acids (Omega-3 Fish Oil) 1000 MG CAPS Fish Oil    Historical Provider, MD   pantoprazole (PROTONIX) 40 mg tablet TAKE 1 TABLET BY MOUTH 2 TIMES A DAY BEFORE MEALS.  Patient not taking: Reported on 11/26/2024 10/3/24   Tobias Correa DO   polyethylene glycol (MiraLax) 17 GM/SCOOP powder  1/1/1995   Historical Provider, MD   Vonoprazan Fumarate 20 MG TABS Take 20 mg by mouth in the morning 12/19/24   Tobias Correa DO   Xiidra 5 % op solution  8/27/24   Historical Provider, MD     No Known  "Allergies    Objective :  Temp:  [98.8 °F (37.1 °C)] 98.8 °F (37.1 °C)  HR:  [] 74  BP: (121-126)/(76-81) 121/76  Resp:  [18] 18  SpO2:  [98 %-100 %] 100 %  O2 Device: None (Room air)    Physical Exam     Gen.-Patient comfortable   Neck- Supple. No thyromegaly or lymphadenopathy  Lungs-Clear bilaterally without any wheeze or rales   Heart S1-S2, regular rate and rhythm, no murmurs  Abdomen-soft nontender, no organomegaly. Bowel sounds present  Left lower quadrant tenderness without any rebound or guarding  Extremities-no cyanosi,  clubbing or edema  Skin- no rash  Neuro-nonfocal    Lines/Drains:            Lab Results: I have reviewed the following results:  Results from last 7 days   Lab Units 01/10/25  1431   WBC Thousand/uL 17.56*   HEMOGLOBIN g/dL 14.3   HEMATOCRIT % 42.8   PLATELETS Thousands/uL 355   SEGS PCT % 70   LYMPHO PCT % 17   MONO PCT % 11   EOS PCT % 0     Results from last 7 days   Lab Units 01/10/25  1431   SODIUM mmol/L 137   POTASSIUM mmol/L 3.5   CHLORIDE mmol/L 102   CO2 mmol/L 25   BUN mg/dL 16   CREATININE mg/dL 1.03   ANION GAP mmol/L 10   CALCIUM mg/dL 9.1   ALBUMIN g/dL 4.2   TOTAL BILIRUBIN mg/dL 0.70   ALK PHOS U/L 58   ALT U/L 6*   AST U/L 12*   GLUCOSE RANDOM mg/dL 113             No results found for: \"HGBA1C\"  Results from last 7 days   Lab Units 01/10/25  1431   LACTIC ACID mmol/L 0.7       Imaging Results Review: No pertinent imaging studies reviewed.  Other Study Results Review: No additional pertinent studies reviewed.    Administrative Statements       ** Please Note: This note has been constructed using a voice recognition system. **    "

## 2025-01-10 NOTE — SEPSIS NOTE
Sepsis Note   Meche Lerma 59 y.o. female MRN: [id number]  Unit/Bed#: ED-06 Encounter: [id number]       Initial Sepsis Screening       Row Name 01/10/25 1531                Is the patient's history suggestive of a new or worsening infection? Yes (Proceed)  -SZ        Suspected source of infection acute abdominal infection  -SZ        Indicate SIRS criteria Tachycardia > 90 bpm;Leukocytosis (WBC > 99999 IJL) OR Leukopenia (WBC <4000 IJL) OR Bandemia (WBC >10% bands)  -SZ        Are two or more of the above signs & symptoms of infection both present and new to the patient? Yes (Proceed)  -SZ        Assess for evidence of organ dysfunction: Are any of the below criteria present within 6 hours of suspected infection and SIRS criteria that are NOT considered to be chronic conditions? --  NO  -SZ                  User Key  (r) = Recorded By, (t) = Taken By, (c) = Cosigned By      Initials Name Provider Type    SZ Simi Farley MD Physician                        There is no height or weight on file to calculate BMI.  Wt Readings from Last 1 Encounters:   11/26/24 68.9 kg (152 lb)        Ideal body weight: 61.6 kg (135 lb 12.9 oz)  Adjusted ideal body weight: 64.5 kg (142 lb 4.5 oz)

## 2025-01-10 NOTE — ED PROCEDURE NOTE
Procedure  POC Renal US    Date/Time: 1/10/2025 3:02 PM    Performed by: Baldev Juarez MD  Authorized by: Baldev Juarez MD    Patient location:  Bedside  Performed by:  Resident  Procedure details:     Exam Type:  Educational    Indications: abdominal pain      Assessment for:  Bladder volume and suspected hydronephrosis    Views obtained: bladder (transverse and sagittal), left kidney and right kidney      Image quality: limited diagnostic      Image availability:  Images available in PACS  Findings:     LEFT kidney findings: renal cyst      RIGHT kidney findings: unremarkable      Bladder:  Visualized  Interpretation:     Renal ultrasound impressions: normal exam                     Baldev Jaurez MD  01/10/25 1503

## 2025-01-11 PROBLEM — K21.9 GASTROESOPHAGEAL REFLUX DISEASE WITHOUT ESOPHAGITIS: Status: ACTIVE | Noted: 2025-01-11

## 2025-01-11 LAB
ALBUMIN SERPL BCG-MCNC: 3 G/DL (ref 3.5–5)
ALP SERPL-CCNC: 38 U/L (ref 34–104)
ALT SERPL W P-5'-P-CCNC: 6 U/L (ref 7–52)
ANION GAP SERPL CALCULATED.3IONS-SCNC: 4 MMOL/L (ref 4–13)
AST SERPL W P-5'-P-CCNC: 8 U/L (ref 13–39)
BASOPHILS # BLD AUTO: 0.04 THOUSANDS/ΜL (ref 0–0.1)
BASOPHILS NFR BLD AUTO: 0 % (ref 0–1)
BILIRUB SERPL-MCNC: 0.42 MG/DL (ref 0.2–1)
BUN SERPL-MCNC: 9 MG/DL (ref 5–25)
C COLI+JEJUNI TUF STL QL NAA+PROBE: NEGATIVE
C DIFF TOX GENS STL QL NAA+PROBE: NEGATIVE
CALCIUM ALBUM COR SERPL-MCNC: 8.7 MG/DL (ref 8.3–10.1)
CALCIUM SERPL-MCNC: 7.9 MG/DL (ref 8.4–10.2)
CHLORIDE SERPL-SCNC: 113 MMOL/L (ref 96–108)
CO2 SERPL-SCNC: 24 MMOL/L (ref 21–32)
CREAT SERPL-MCNC: 0.87 MG/DL (ref 0.6–1.3)
EC STX1+STX2 GENES STL QL NAA+PROBE: NEGATIVE
EOSINOPHIL # BLD AUTO: 0.11 THOUSAND/ΜL (ref 0–0.61)
EOSINOPHIL NFR BLD AUTO: 1 % (ref 0–6)
ERYTHROCYTE [DISTWIDTH] IN BLOOD BY AUTOMATED COUNT: 11.7 % (ref 11.6–15.1)
GFR SERPL CREATININE-BSD FRML MDRD: 73 ML/MIN/1.73SQ M
GLUCOSE SERPL-MCNC: 89 MG/DL (ref 65–140)
HCT VFR BLD AUTO: 34.3 % (ref 34.8–46.1)
HGB BLD-MCNC: 11.3 G/DL (ref 11.5–15.4)
IMM GRANULOCYTES # BLD AUTO: 0.04 THOUSAND/UL (ref 0–0.2)
IMM GRANULOCYTES NFR BLD AUTO: 0 % (ref 0–2)
LYMPHOCYTES # BLD AUTO: 2.24 THOUSANDS/ΜL (ref 0.6–4.47)
LYMPHOCYTES NFR BLD AUTO: 22 % (ref 14–44)
MAGNESIUM SERPL-MCNC: 2 MG/DL (ref 1.9–2.7)
MCH RBC QN AUTO: 30.1 PG (ref 26.8–34.3)
MCHC RBC AUTO-ENTMCNC: 32.7 G/DL (ref 31.4–37.4)
MCV RBC AUTO: 92 FL (ref 82–98)
MONOCYTES # BLD AUTO: 0.95 THOUSAND/ΜL (ref 0.17–1.22)
MONOCYTES NFR BLD AUTO: 9 % (ref 4–12)
NEUTROPHILS # BLD AUTO: 7 THOUSANDS/ΜL (ref 1.85–7.62)
NEUTS SEG NFR BLD AUTO: 68 % (ref 43–75)
NRBC BLD AUTO-RTO: 0 /100 WBCS
PLATELET # BLD AUTO: 289 THOUSANDS/UL (ref 149–390)
PMV BLD AUTO: 9.7 FL (ref 8.9–12.7)
POTASSIUM SERPL-SCNC: 4 MMOL/L (ref 3.5–5.3)
PROCALCITONIN SERPL-MCNC: <0.05 NG/ML
PROT SERPL-MCNC: 5.5 G/DL (ref 6.4–8.4)
RBC # BLD AUTO: 3.72 MILLION/UL (ref 3.81–5.12)
SALMONELLA SP SPAO STL QL NAA+PROBE: NEGATIVE
SHIGELLA SP+EIEC IPAH STL QL NAA+PROBE: NEGATIVE
SODIUM SERPL-SCNC: 141 MMOL/L (ref 135–147)
WBC # BLD AUTO: 10.38 THOUSAND/UL (ref 4.31–10.16)

## 2025-01-11 PROCEDURE — 83735 ASSAY OF MAGNESIUM: CPT | Performed by: INTERNAL MEDICINE

## 2025-01-11 PROCEDURE — 85025 COMPLETE CBC W/AUTO DIFF WBC: CPT | Performed by: INTERNAL MEDICINE

## 2025-01-11 PROCEDURE — 99232 SBSQ HOSP IP/OBS MODERATE 35: CPT | Performed by: INTERNAL MEDICINE

## 2025-01-11 PROCEDURE — 80053 COMPREHEN METABOLIC PANEL: CPT | Performed by: INTERNAL MEDICINE

## 2025-01-11 PROCEDURE — 84145 PROCALCITONIN (PCT): CPT | Performed by: INTERNAL MEDICINE

## 2025-01-11 RX ORDER — HEPARIN SODIUM 5000 [USP'U]/ML
5000 INJECTION, SOLUTION INTRAVENOUS; SUBCUTANEOUS EVERY 8 HOURS SCHEDULED
Status: DISCONTINUED | OUTPATIENT
Start: 2025-01-11 | End: 2025-01-13 | Stop reason: HOSPADM

## 2025-01-11 RX ORDER — PANTOPRAZOLE SODIUM 40 MG/1
40 TABLET, DELAYED RELEASE ORAL
Status: DISCONTINUED | OUTPATIENT
Start: 2025-01-11 | End: 2025-01-11

## 2025-01-11 RX ADMIN — METRONIDAZOLE 500 MG: 500 INJECTION, SOLUTION INTRAVENOUS at 23:03

## 2025-01-11 RX ADMIN — METRONIDAZOLE 500 MG: 500 INJECTION, SOLUTION INTRAVENOUS at 09:03

## 2025-01-11 RX ADMIN — SODIUM CHLORIDE 125 ML/HR: 0.9 INJECTION, SOLUTION INTRAVENOUS at 15:35

## 2025-01-11 RX ADMIN — SIMETHICONE 80 MG: 80 TABLET, CHEWABLE ORAL at 23:02

## 2025-01-11 RX ADMIN — SIMETHICONE 80 MG: 80 TABLET, CHEWABLE ORAL at 09:04

## 2025-01-11 RX ADMIN — METRONIDAZOLE 500 MG: 500 INJECTION, SOLUTION INTRAVENOUS at 00:05

## 2025-01-11 RX ADMIN — ACETAMINOPHEN 650 MG: 325 TABLET, FILM COATED ORAL at 11:56

## 2025-01-11 RX ADMIN — CEFTRIAXONE SODIUM 1000 MG: 10 INJECTION, POWDER, FOR SOLUTION INTRAVENOUS at 16:41

## 2025-01-11 RX ADMIN — SODIUM CHLORIDE 125 ML/HR: 0.9 INJECTION, SOLUTION INTRAVENOUS at 23:05

## 2025-01-11 RX ADMIN — SODIUM CHLORIDE 125 ML/HR: 0.9 INJECTION, SOLUTION INTRAVENOUS at 06:33

## 2025-01-11 RX ADMIN — SIMETHICONE 80 MG: 80 TABLET, CHEWABLE ORAL at 11:44

## 2025-01-11 RX ADMIN — SIMETHICONE 80 MG: 80 TABLET, CHEWABLE ORAL at 15:38

## 2025-01-11 RX ADMIN — METRONIDAZOLE 500 MG: 500 INJECTION, SOLUTION INTRAVENOUS at 15:36

## 2025-01-11 RX ADMIN — FLUOXETINE 10 MG: 10 CAPSULE ORAL at 09:04

## 2025-01-11 NOTE — ASSESSMENT & PLAN NOTE
Presented with leukocytosis and tachycardia in the setting of diverticulitis (see below)  Monitor vitals and maintain hemodynamics -> goal MAP > 65  Collected blood cultures remain negative thus far  Baseline lactic acid and serial procalcitonins are normal

## 2025-01-11 NOTE — UTILIZATION REVIEW
Initial Clinical Review    Admission: Date/Time/Statement:   Admission Orders (From admission, onward)       Ordered        01/10/25 1631  INPATIENT ADMISSION  Once                          Orders Placed This Encounter   Procedures    INPATIENT ADMISSION     Standing Status:   Standing     Number of Occurrences:   1     Level of Care:   Med Surg [16]     Estimated length of stay:   More than 2 Midnights     Certification:   I certify that inpatient services are medically necessary for this patient for a duration of greater than two midnights. See H&P and MD Progress Notes for additional information about the patient's course of treatment.     ED Arrival Information       Expected   -    Arrival   1/10/2025 13:20    Acuity   Urgent              Means of arrival   Walk-In    Escorted by   Self    Service   Hospitalist    Admission type   Emergency              Arrival complaint   Abdominal Pain             Chief Complaint   Patient presents with    Abdominal Pain     Pt reports bloating and abd pain. Since January 1st. Poor appetite. Bloat has been going on for month sand has been seen by gastro for it several times.        Initial Presentation: 59 y.o. female PMH of anxiety, abdominal bloating, history of colonic polyps, who presents to ED from home with left lower quadrant abdominal pain since January 1, 2025.  Patient symptoms have been getting worse.  Whenever patient eats she gets intense pain in the left lower quadrant. On exam, pt tachycardic  , entire lower abdomen-maximal in the left lower quadrant where guarding is present .Abdomen tympanic , distended abdomen , increased bowel sounds. Pt very uncomfortable appearing and cautious with movements.  Labs WBC 17.56. Imaging shows acute uncomplicated sigmoid diverticulitis . Pt given IVF, IV abx, IV Tylenol in ED. Admitted as Inpatient with sepsis. Acute diverticulitis. Plan-  IVF. IV abx- Ceftriaxone, Flagyl. CL diet. Pain control.   Anticipated Length of  Stay/Certification Statement: Patient will be admitted on an inpatient basis with an anticipated length of stay of greater than 2 midnights secondary to IV antibiotics.     Date: 1/11    Day 2:      Complains of some bloating. Frequency of diarrhea is slowly starting to improve. LLQ abdominal tenderness to palpation   WBC down to 10.38 .Pt continues on IV Rocephin and Flagyl. F/U cx, blood cx neg so far C. difficile PCR negative - await enteric stool culture. .IVF infusing  . Tolerating clear liquids thus far -> plan to advance to full liquids with toast/crackers . Monitor/replete electrolytes, if necessary, from insensible losses . CBC in am .     ED Treatment-Medication Administration from 01/10/2025 1320 to 01/10/2025 1839         Date/Time Order Dose Route Action     01/10/2025 1434 sodium chloride 0.9 % bolus 1,000 mL 1,000 mL Intravenous New Bag     01/10/2025 1510 iohexol (OMNIPAQUE) 350 MG/ML injection (MULTI-DOSE) 80 mL 80 mL Intravenous Given     01/10/2025 1638 ceftriaxone (ROCEPHIN) 1 g/50 mL in dextrose IVPB 1,000 mg Intravenous New Bag     01/10/2025 1721 metroNIDAZOLE (FLAGYL) IVPB (premix) 500 mg 100 mL 500 mg Intravenous New Bag     01/10/2025 1637 acetaminophen (Ofirmev) injection 1,000 mg 1,000 mg Intravenous New Bag     01/10/2025 1700 sodium chloride 0.9 % infusion 125 mL/hr Intravenous New Bag     01/10/2025 1657 simethicone (MYLICON) chewable tablet 80 mg 80 mg Oral Given            Scheduled Medications:  cefTRIAXone, 1,000 mg, Intravenous, Q24H  FLUoxetine, 10 mg, Oral, Daily  heparin (porcine), 5,000 Units, Subcutaneous, Q8H [place]  metroNIDAZOLE, 500 mg, Intravenous, Q8H  simethicone, 80 mg, Oral, 4x Daily (with meals and at bedtime)    sodium chloride 0.9 % bolus 1,000 mL  Dose: 1,000 mL  Freq: Once Route: IV  Last Dose: Stopped (01/10/25 2145)  Start: 01/10/25 1845 End: 01/10/25 2145  Continuous IV Infusions:  sodium chloride, 125 mL/hr, Intravenous, Continuous      PRN  Meds:  acetaminophen, 650 mg, Oral, Q6H PRN x1 1/11  calcium carbonate, 1,000 mg, Oral, Daily PRN  ondansetron, 4 mg, Intravenous, Q6H PRN      ED Triage Vitals   Temperature Pulse Respirations Blood Pressure SpO2 Pain Score   01/10/25 1327 01/10/25 1327 01/10/25 1327 01/10/25 1327 01/10/25 1327 01/10/25 1916   98.8 °F (37.1 °C) (!) 115 18 126/81 98 % No Pain     Weight (last 2 days)       Date/Time Weight    01/11/25 0538 69.2 (152.56)    01/10/25 1916 68.9 (152)    01/10/25 1842 147 (324.08)    01/10/25 1701 69.1 (152.34)            Vital Signs (last 3 days)       Date/Time Temp Pulse Resp BP MAP (mmHg) SpO2 O2 Device Patient Position - Orthostatic VS Pain    01/11/25 1156 -- -- -- -- -- -- -- -- 3    01/11/25 08:08:09 98 °F (36.7 °C) 73 16 102/64 77 96 % -- -- --    01/10/25 22:20:44 97.9 °F (36.6 °C) 62 16 97/66 76 95 % -- -- --    01/10/25 1916 -- -- -- -- -- -- None (Room air) -- No Pain    01/10/25 18:58:39 98.9 °F (37.2 °C) 72 18 108/69 82 94 % -- -- --    01/10/25 1805 -- 71 18 111/71 86 96 % None (Room air) Sitting --    01/10/25 1519 -- -- -- -- -- -- None (Room air) -- --    01/10/25 1518 -- 74 18 121/76 94 100 % None (Room air) Sitting --    01/10/25 1327 98.8 °F (37.1 °C) 115 18 126/81 97 98 % None (Room air) Sitting --              Pertinent Labs/Diagnostic Test Results:   Radiology:  CT abdomen pelvis with contrast   Final Interpretation by Timi Monzon DO (01/10 1540)      Acute uncomplicated sigmoid diverticulitis. No abscess or free air. Small volume of simple pelvic free fluid noted.      The study was marked in EPIC for immediate notification.         Workstation performed: [id number]                   Results from last 7 days   Lab Units 01/11/25  0427 01/10/25  1431   WBC Thousand/uL 10.38* 17.56*   HEMOGLOBIN g/dL 11.3* 14.3   HEMATOCRIT % 34.3* 42.8   PLATELETS Thousands/uL 289 355   TOTAL NEUT ABS Thousands/µL 7.00 12.43*         Results from last 7 days   Lab Units 01/11/25 0427  01/10/25  1431   SODIUM mmol/L 141 137   POTASSIUM mmol/L 4.0 3.5   CHLORIDE mmol/L 113* 102   CO2 mmol/L 24 25   ANION GAP mmol/L 4 10   BUN mg/dL 9 16   CREATININE mg/dL 0.87 1.03   EGFR ml/min/1.73sq m 73 60   CALCIUM mg/dL 7.9* 9.1   MAGNESIUM mg/dL 2.0  --      Results from last 7 days   Lab Units 01/11/25  0427 01/10/25  1431   AST U/L 8* 12*   ALT U/L 6* 6*   ALK PHOS U/L 38 58   TOTAL PROTEIN g/dL 5.5* 7.3   ALBUMIN g/dL 3.0* 4.2   TOTAL BILIRUBIN mg/dL 0.42 0.70         Results from last 7 days   Lab Units 01/11/25  0427 01/10/25  1431   GLUCOSE RANDOM mg/dL 89 113                       Results from last 7 days   Lab Units 01/11/25  0427 01/10/25  1909   PROCALCITONIN ng/ml <0.05 <0.05     Results from last 7 days   Lab Units 01/10/25  1431   LACTIC ACID mmol/L 0.7               Results from last 7 days   Lab Units 01/10/25  1431   LIPASE u/L 15                 Results from last 7 days   Lab Units 01/10/25  1850   CLARITY UA  Clear   COLOR UA  Colorless   SPEC GRAV UA  >=1.050*   PH UA  6.5   GLUCOSE UA mg/dl Negative   KETONES UA mg/dl Negative   BLOOD UA  Small*   PROTEIN UA mg/dl Negative   NITRITE UA  Negative   BILIRUBIN UA  Negative   UROBILINOGEN UA (BE) mg/dl <2.0   LEUKOCYTES UA  Negative   WBC UA /hpf 1-2   RBC UA /hpf 1-2   BACTERIA UA /hpf None Seen   EPITHELIAL CELLS WET PREP /hpf Occasional                     Results from last 7 days   Lab Units 01/10/25  1849   C DIFF TOXIN B BY PCR  Negative             Results from last 7 days   Lab Units 01/10/25  1633 01/10/25  1619   BLOOD CULTURE  Received in Microbiology Lab. Culture in Progress. Received in Microbiology Lab. Culture in Progress.                   Past Medical History:   Diagnosis Date    GERD (gastroesophageal reflux disease)      Present on Admission:  **None**      Admitting Diagnosis: Loss of appetite [R63.0]  Abdominal pain [R10.9]  Sigmoid diverticulitis [K57.32]  Age/Sex: 59 y.o. female    Network Utilization Review  Department  ATTENTION: Please call with any questions or concerns to [phone] and carefully listen to the prompts so that you are directed to the right person. All voicemails are confidential.   For Discharge needs, contact Care Management DC Support Team at [phone] opt. [phone]  Send all requests for admission clinical reviews, approved or denied determinations and any other requests to dedicated fax number below belonging to the campus where the patient is receiving treatment. List of dedicated fax numbers for the Facilities:  FACILITY NAME UR FAX NUMBER   ADMISSION DENIALS (Administrative/Medical Necessity) [phone]   DISCHARGE SUPPORT TEAM (NETWORK) [phone]   PARENT CHILD HEALTH (Maternity/NICU/Pediatrics) [phone]   [facility] [phone]   [facility] [phone]   [facility] [phone]   [facility] [phone]   [facility] [phone]   [facility] [phone]   [facility] [phone]   [facility] [phone]   [facility] [phone]   [facility] [phone]   [facility] [phone]   [facility] [phone]

## 2025-01-11 NOTE — PROGRESS NOTES
Progress Note - Hospitalist   Name: Meche Lerma 59 y.o. female I MRN: [id number]  Unit/Bed#: S -01 I Date of Admission: 1/10/2025   Date of Service: 1/11/2025 I Hospital Day: 1    Assessment & Plan  Sepsis (HCC)  Presented with leukocytosis and tachycardia in the setting of diverticulitis (see below)  Monitor vitals and maintain hemodynamics -> goal MAP > 65  Collected blood cultures remain negative thus far  Baseline lactic acid and serial procalcitonins are normal  Acute diverticulitis  Presents with abdominal pain over the last week and a half coupled with nausea  CT imaging with evidence of sigmoid diverticulitis  Continue Rocephin/Flagyl  Tolerating clear liquids thus far -> plan to advance to full liquids with toast/crackers  PRN pain/emesis control  C. difficile PCR negative - await enteric stool culture  IV fluid hydration  Monitor/replete electrolytes, if necessary, from insensible losses  Anxiety  Continue Prozac  History of colonic polyps  History of colonic polyps status post excision  follows outpatient gastroenterology (Dr. Pelayo)  Gastroesophageal reflux disease without esophagitis  Continue home regimen (takes nonformulary Vonoprazan)      VTE Pharmacologic Prophylaxis: VTE Score: 3 Moderate Risk (Score 3-4) - Pharmacological DVT Prophylaxis Ordered: Heparin.    AM-PAC Basic Mobility:  Basic Mobility Inpatient Raw Score: 24  JH-HLM Goal: 8: Walk 250 feet or more  JH-HLM Achieved: 7: Walk 25 feet or more  JH-HLM Goal NOT achieved. Continue with multidisciplinary rounding and encourage appropriate mobility to improve upon JH-HLM goals.    Patient Centered Rounds:  I have performed bedside rounds and discussed plan of care with nursing today.  Discussions with Specialists or Other Care Team Provider:  see above assessments if applicable    Education and Discussions with Family / Patient:  Patient at bedside, who will self update     Time Spent for Care:  35 minutes. More than 50% of  total time spent on counseling and coordination of care, on one or more of the following: performing physical exam; counseling and coordination of care, obtaining or reviewing history, documenting in the medical record, reviewing/ordering tests/medications/procedures, and communicating with other healthcare professionals.    Current Length of Stay: 1 day(s)  Current Patient Status: Inpatient   Certification Statement:  Patient will continue to require additional hospital stay due to assessments as noted above.    Code Status: Level 1 - Full Code      Subjective     Encountered earlier in the day.  Complains of some bloating, but otherwise without acute issues.  Frequency of diarrhea is slowly starting to improve.      Objective     Vitals:   Temp (24hrs), Av.3 °F (36.8 °C), Min:97.9 °F (36.6 °C), Max:98.9 °F (37.2 °C)    Temp:  [97.9 °F (36.6 °C)-98.9 °F (37.2 °C)] 98 °F (36.7 °C)  HR:  [62-74] 73  Resp:  [16-18] 16  BP: ()/(64-76) 102/64  SpO2:  [94 %-100 %] 96 %  Body mass index is 23.89 kg/m².     Input and Output Summary (last 24 hours):       Intake/Output Summary (Last 24 hours) at 2025 1442  Last data filed at 1/10/2025 2145  Gross per 24 hour   Intake 2150 ml   Output --   Net 2150 ml       Physical Exam:     GENERAL Waxing/waning but improved distress from abdominal pain   HEAD   Normocephalic - atraumatic   EYES Nonicteric   MOUTH   Mucosa moist   NECK   Supple - full range of motion   CARDIAC   Regular rate/rhythm - S1/S2 positive   PULMONARY   Clear breath sounds bilaterally - nonlabored respirations   ABDOMEN Nondistended with residual LLQ tenderness to palpation   MUSCULOSKELETAL   Motor strength/range of motion intact   NEUROLOGIC   Alert/oriented at baseline   PSYCHIATRIC   Mood/affect stable         Labs & Recent Cultures:  Results from last 7 days   Lab Units 25  0427   WBC Thousand/uL 10.38*   HEMOGLOBIN g/dL 11.3*   HEMATOCRIT % 34.3*   PLATELETS Thousands/uL 289   SEGS PCT %  68   LYMPHO PCT % 22   MONO PCT % 9   EOS PCT % 1     Results from last 7 days   Lab Units 01/11/25  0427   POTASSIUM mmol/L 4.0   CHLORIDE mmol/L 113*   CO2 mmol/L 24   BUN mg/dL 9   CREATININE mg/dL 0.87   CALCIUM mg/dL 7.9*   ALK PHOS U/L 38   ALT U/L 6*   AST U/L 8*                 Results from last 7 days   Lab Units 01/11/25  0427 01/10/25  1909 01/10/25  1431   LACTIC ACID mmol/L  --   --  0.7   PROCALCITONIN ng/ml <0.05 <0.05  --          Results from last 7 days   Lab Units 01/10/25  1849 01/10/25  1633 01/10/25  1619   BLOOD CULTURE   --  Received in Microbiology Lab. Culture in Progress. Received in Microbiology Lab. Culture in Progress.   C DIFF TOXIN B BY PCR  Negative  --   --          Lines/Drains/Telemetry:  Invasive Devices       Peripheral Intravenous Line  Duration             Peripheral IV 01/10/25 Left;Ventral (anterior) Forearm 1 day    Peripheral IV 01/10/25 Dorsal (posterior);Right Forearm <1 day                    Last 24 Hours Medication List:   Current Facility-Administered Medications   Medication Dose Route Frequency Provider Last Rate    acetaminophen  650 mg Oral Q6H PRN Pop Stokes MD      calcium carbonate  1,000 mg Oral Daily PRN Pop Stokes MD      cefTRIAXone  1,000 mg Intravenous Q24H Padmini Melton MD      FLUoxetine  10 mg Oral Daily Pop Stokes MD      heparin (porcine)  5,000 Units Subcutaneous Q8H [facility] Padmini Melton MD      metroNIDAZOLE  500 mg Intravenous Q8H Pop Stokes  mg (01/11/25 0903)    [START ON 1/12/2025] NON FORMULARY  20 mg Oral Daily Before Breakfast Padmini Melton MD      ondansetron  4 mg Intravenous Q6H PRN Pop Stokes MD      simethicone  80 mg Oral 4x Daily (with meals and at bedtime) Pop Stokes MD      sodium chloride  125 mL/hr Intravenous Continuous Pop Stokes  mL/hr (01/11/25 0633)              PADMINI MELTON MD   Hospitalist - [facility]        ** Please Note:   Documentation is constructed using a voice recognition dictation system.  An occasional wrong word/phrase or “sound-a-like” substitution may have been picked up by dictation device due to the inherent limitations of voice recognition software.  Read the chart carefully and recognize, using reasonable context, where substitutions may have occurred.**

## 2025-01-11 NOTE — ASSESSMENT & PLAN NOTE
Presents with abdominal pain over the last week and a half coupled with nausea  CT imaging with evidence of sigmoid diverticulitis  Continue Rocephin/Flagyl  Tolerating clear liquids thus far -> plan to advance to full liquids with toast/crackers  PRN pain/emesis control  C. difficile PCR negative - await enteric stool culture  IV fluid hydration  Monitor/replete electrolytes, if necessary, from insensible losses

## 2025-01-12 LAB
BASOPHILS # BLD AUTO: 0.04 THOUSANDS/ΜL (ref 0–0.1)
BASOPHILS NFR BLD AUTO: 0 % (ref 0–1)
EOSINOPHIL # BLD AUTO: 0.17 THOUSAND/ΜL (ref 0–0.61)
EOSINOPHIL NFR BLD AUTO: 2 % (ref 0–6)
ERYTHROCYTE [DISTWIDTH] IN BLOOD BY AUTOMATED COUNT: 11.9 % (ref 11.6–15.1)
HCT VFR BLD AUTO: 33.6 % (ref 34.8–46.1)
HGB BLD-MCNC: 11.1 G/DL (ref 11.5–15.4)
IMM GRANULOCYTES # BLD AUTO: 0.05 THOUSAND/UL (ref 0–0.2)
IMM GRANULOCYTES NFR BLD AUTO: 1 % (ref 0–2)
LYMPHOCYTES # BLD AUTO: 2.39 THOUSANDS/ΜL (ref 0.6–4.47)
LYMPHOCYTES NFR BLD AUTO: 25 % (ref 14–44)
MCH RBC QN AUTO: 30.6 PG (ref 26.8–34.3)
MCHC RBC AUTO-ENTMCNC: 33 G/DL (ref 31.4–37.4)
MCV RBC AUTO: 93 FL (ref 82–98)
MONOCYTES # BLD AUTO: 0.75 THOUSAND/ΜL (ref 0.17–1.22)
MONOCYTES NFR BLD AUTO: 8 % (ref 4–12)
NEUTROPHILS # BLD AUTO: 6.27 THOUSANDS/ΜL (ref 1.85–7.62)
NEUTS SEG NFR BLD AUTO: 64 % (ref 43–75)
NRBC BLD AUTO-RTO: 0 /100 WBCS
PLATELET # BLD AUTO: 284 THOUSANDS/UL (ref 149–390)
PMV BLD AUTO: 9.5 FL (ref 8.9–12.7)
RBC # BLD AUTO: 3.63 MILLION/UL (ref 3.81–5.12)
WBC # BLD AUTO: 9.67 THOUSAND/UL (ref 4.31–10.16)

## 2025-01-12 PROCEDURE — 85025 COMPLETE CBC W/AUTO DIFF WBC: CPT | Performed by: INTERNAL MEDICINE

## 2025-01-12 PROCEDURE — 99232 SBSQ HOSP IP/OBS MODERATE 35: CPT | Performed by: INTERNAL MEDICINE

## 2025-01-12 RX ADMIN — SODIUM CHLORIDE 125 ML/HR: 0.9 INJECTION, SOLUTION INTRAVENOUS at 07:57

## 2025-01-12 RX ADMIN — SIMETHICONE 80 MG: 80 TABLET, CHEWABLE ORAL at 15:44

## 2025-01-12 RX ADMIN — CEFTRIAXONE SODIUM 1000 MG: 10 INJECTION, POWDER, FOR SOLUTION INTRAVENOUS at 17:11

## 2025-01-12 RX ADMIN — VONOPRAZAN FUMARATE 20 MG: 26.72 TABLET ORAL at 07:52

## 2025-01-12 RX ADMIN — SIMETHICONE 80 MG: 80 TABLET, CHEWABLE ORAL at 21:06

## 2025-01-12 RX ADMIN — SIMETHICONE 80 MG: 80 TABLET, CHEWABLE ORAL at 07:52

## 2025-01-12 RX ADMIN — SIMETHICONE 80 MG: 80 TABLET, CHEWABLE ORAL at 13:21

## 2025-01-12 RX ADMIN — METRONIDAZOLE 500 MG: 500 INJECTION, SOLUTION INTRAVENOUS at 15:45

## 2025-01-12 RX ADMIN — METRONIDAZOLE 500 MG: 500 INJECTION, SOLUTION INTRAVENOUS at 07:52

## 2025-01-12 RX ADMIN — FLUOXETINE 10 MG: 10 CAPSULE ORAL at 09:01

## 2025-01-12 RX ADMIN — METRONIDAZOLE 500 MG: 500 INJECTION, SOLUTION INTRAVENOUS at 23:11

## 2025-01-12 NOTE — ASSESSMENT & PLAN NOTE
Presents with abdominal pain over the last week and a half coupled with nausea  CT imaging with evidence of sigmoid diverticulitis  Continue IV Rocephin/Flagyl -> if tolerating advancement of diet, anticipate discharge tomorrow on oral antibiotics to complete course  PRN pain/emesis control  C. difficile PCR and enteric stool culture negative   Continue IV fluid hydration  Monitor/replete electrolytes, if necessary, from insensible losses

## 2025-01-12 NOTE — ASSESSMENT & PLAN NOTE
Presented with leukocytosis and tachycardia in the setting of diverticulitis (see below) -> SIRS criteria now normalized  Monitor vitals and maintain hemodynamics -> goal MAP > 65  Collected blood cultures remain negative thus far  Baseline lactic acid and serial procalcitonins are normal

## 2025-01-12 NOTE — PROGRESS NOTES
Progress Note - Hospitalist   Name: Meche Lerma 59 y.o. female I MRN: [id number]  Unit/Bed#: S -01 I Date of Admission: 1/10/2025   Date of Service: 1/12/2025 I Hospital Day: 2    Assessment & Plan  Sepsis (HCC)  Presented with leukocytosis and tachycardia in the setting of diverticulitis (see below) -> SIRS criteria now normalized  Monitor vitals and maintain hemodynamics -> goal MAP > 65  Collected blood cultures remain negative thus far  Baseline lactic acid and serial procalcitonins are normal  Acute diverticulitis  Presents with abdominal pain over the last week and a half coupled with nausea  CT imaging with evidence of sigmoid diverticulitis  Continue IV Rocephin/Flagyl -> if tolerating advancement of diet, anticipate discharge tomorrow on oral antibiotics to complete course  PRN pain/emesis control  C. difficile PCR and enteric stool culture negative   Continue IV fluid hydration  Monitor/replete electrolytes, if necessary, from insensible losses  Anxiety  Continue Prozac  History of colonic polyps  History of colonic polyps status post excision  follows outpatient gastroenterology (Dr. Pelayo)  Gastroesophageal reflux disease without esophagitis  Continue home regimen (takes nonformulary Vonoprazan)      VTE Pharmacologic Prophylaxis: VTE Score: 3 Moderate Risk (Score 3-4) - Pharmacological DVT Prophylaxis Ordered: Heparin.    AM-PAC Basic Mobility:  Basic Mobility Inpatient Raw Score: 24  JH-HLM Goal: 8: Walk 250 feet or more  JH-HLM Achieved: 8: Walk 250 feet ot more  JH-HLM Goal NOT achieved. Continue with multidisciplinary rounding and encourage appropriate mobility to improve upon JH-HLM goals.    Patient Centered Rounds:  I have performed bedside rounds and discussed plan of care with nursing today.  Discussions with Specialists or Other Care Team Provider:  see above assessments if applicable    Education and Discussions with Family / Patient:  Patient, along with , at bedside  today    Time Spent for Care:  35 minutes. More than 50% of total time spent on counseling and coordination of care, on one or more of the following: performing physical exam; counseling and coordination of care, obtaining or reviewing history, documenting in the medical record, reviewing/ordering tests/medications/procedures, and communicating with other healthcare professionals.    Current Length of Stay: 2 day(s)  Current Patient Status: Inpatient   Certification Statement:  Patient will continue to require additional hospital stay due to assessments as noted above.    Code Status: Level 1 - Full Code      Subjective     Seen and examined earlier today.  Reports improvement in abdominal cramping/discomfort today.  Also notes that stools are becoming a bit more formed.  Denies fever/chills currently.      Objective     Vitals:   Temp (24hrs), Av.1 °F (36.7 °C), Min:98 °F (36.7 °C), Max:98.1 °F (36.7 °C)    Temp:  [98 °F (36.7 °C)-98.1 °F (36.7 °C)] 98.1 °F (36.7 °C)  HR:  [51-63] 51  Resp:  [16-18] 18  BP: (109-121)/(71-73) 109/71  SpO2:  [97 %-100 %] 100 %  Body mass index is 24.37 kg/m².     Input and Output Summary (last 24 hours):       Intake/Output Summary (Last 24 hours) at 2025 1557  Last data filed at 2025 1540  Gross per 24 hour   Intake 2425.83 ml   Output --   Net 2425.83 ml       Physical Exam:     GENERAL Improved distress from abdominal pain/cramping   HEAD   Normocephalic - atraumatic   EYES Nonicteric   MOUTH   Mucosa moist   NECK   Supple - full range of motion   CARDIAC Rate controlled   PULMONARY Clear bilateral breath sounds   ABDOMEN Improved LLQ tenderness to palpation - nondistended   MUSCULOSKELETAL   Motor strength/range of motion intact   NEUROLOGIC   Alert/oriented at baseline   PSYCHIATRIC   Mood/affect stable         Labs & Recent Cultures:  Results from last 7 days   Lab Units 25  0632   WBC Thousand/uL 9.67   HEMOGLOBIN g/dL 11.1*   HEMATOCRIT % 33.6*   PLATELETS  Thousands/uL 284   SEGS PCT % 64   LYMPHO PCT % 25   MONO PCT % 8   EOS PCT % 2     Results from last 7 days   Lab Units 01/11/25  0427   POTASSIUM mmol/L 4.0   CHLORIDE mmol/L 113*   CO2 mmol/L 24   BUN mg/dL 9   CREATININE mg/dL 0.87   CALCIUM mg/dL 7.9*   ALK PHOS U/L 38   ALT U/L 6*   AST U/L 8*                 Results from last 7 days   Lab Units 01/11/25  0427 01/10/25  1909 01/10/25  1431   LACTIC ACID mmol/L  --   --  0.7   PROCALCITONIN ng/ml <0.05 <0.05  --          Results from last 7 days   Lab Units 01/10/25  1849 01/10/25  1633 01/10/25  1619   BLOOD CULTURE   --  No Growth at 24 hrs. No Growth at 24 hrs.   C DIFF TOXIN B BY PCR  Negative  --   --          Lines/Drains/Telemetry:  Invasive Devices       Peripheral Intravenous Line  Duration             Peripheral IV 01/10/25 Left;Ventral (anterior) Forearm 2 days    Peripheral IV 01/10/25 Dorsal (posterior);Right Forearm 1 day                    Last 24 Hours Medication List:   Current Facility-Administered Medications   Medication Dose Route Frequency Provider Last Rate    acetaminophen  650 mg Oral Q6H PRN Pop Stokes MD      calcium carbonate  1,000 mg Oral Daily PRN Pop Stokes MD      cefTRIAXone  1,000 mg Intravenous Q24H Padmini Melton MD 1,000 mg (01/11/25 1641)    FLUoxetine  10 mg Oral Daily Pop Stokes MD      heparin (porcine)  5,000 Units Subcutaneous Q8H Replaced by [facility] Padmini Melton MD      metroNIDAZOLE  500 mg Intravenous Q8H Pop Stokes  mg (01/12/25 1545)    ondansetron  4 mg Intravenous Q6H PRN Pop Stokes MD      simethicone  80 mg Oral 4x Daily (with meals and at bedtime) Pop Stokes MD      sodium chloride  125 mL/hr Intravenous Continuous Pop Stokes  mL/hr (01/12/25 4497)    Vonoprazan Fumarate  20 mg Oral Daily Before Breakfast MD PADMINI Gardiner MD   Hospitalist - [facility]        ** Please Note:  Documentation is  constructed using a voice recognition dictation system.  An occasional wrong word/phrase or “sound-a-like” substitution may have been picked up by dictation device due to the inherent limitations of voice recognition software.  Read the chart carefully and recognize, using reasonable context, where substitutions may have occurred.**

## 2025-01-13 VITALS
TEMPERATURE: 98.3 F | SYSTOLIC BLOOD PRESSURE: 125 MMHG | DIASTOLIC BLOOD PRESSURE: 76 MMHG | OXYGEN SATURATION: 97 % | RESPIRATION RATE: 18 BRPM | HEART RATE: 59 BPM | WEIGHT: 155.2 LBS | BODY MASS INDEX: 24.31 KG/M2

## 2025-01-13 LAB
ANION GAP SERPL CALCULATED.3IONS-SCNC: 4 MMOL/L (ref 4–13)
BUN SERPL-MCNC: 10 MG/DL (ref 5–25)
CALCIUM SERPL-MCNC: 8.2 MG/DL (ref 8.4–10.2)
CHLORIDE SERPL-SCNC: 115 MMOL/L (ref 96–108)
CO2 SERPL-SCNC: 23 MMOL/L (ref 21–32)
CREAT SERPL-MCNC: 0.87 MG/DL (ref 0.6–1.3)
ERYTHROCYTE [DISTWIDTH] IN BLOOD BY AUTOMATED COUNT: 11.9 % (ref 11.6–15.1)
GFR SERPL CREATININE-BSD FRML MDRD: 73 ML/MIN/1.73SQ M
GLUCOSE SERPL-MCNC: 84 MG/DL (ref 65–140)
HCT VFR BLD AUTO: 34.9 % (ref 34.8–46.1)
HGB BLD-MCNC: 11.4 G/DL (ref 11.5–15.4)
MAGNESIUM SERPL-MCNC: 1.7 MG/DL (ref 1.9–2.7)
MCH RBC QN AUTO: 30.4 PG (ref 26.8–34.3)
MCHC RBC AUTO-ENTMCNC: 32.7 G/DL (ref 31.4–37.4)
MCV RBC AUTO: 93 FL (ref 82–98)
PHOSPHATE SERPL-MCNC: 3.3 MG/DL (ref 2.7–4.5)
PLATELET # BLD AUTO: 296 THOUSANDS/UL (ref 149–390)
PMV BLD AUTO: 9.8 FL (ref 8.9–12.7)
POTASSIUM SERPL-SCNC: 3.7 MMOL/L (ref 3.5–5.3)
RBC # BLD AUTO: 3.75 MILLION/UL (ref 3.81–5.12)
SODIUM SERPL-SCNC: 142 MMOL/L (ref 135–147)
WBC # BLD AUTO: 9.26 THOUSAND/UL (ref 4.31–10.16)

## 2025-01-13 PROCEDURE — 80048 BASIC METABOLIC PNL TOTAL CA: CPT | Performed by: INTERNAL MEDICINE

## 2025-01-13 PROCEDURE — 84100 ASSAY OF PHOSPHORUS: CPT | Performed by: INTERNAL MEDICINE

## 2025-01-13 PROCEDURE — 99223 1ST HOSP IP/OBS HIGH 75: CPT | Performed by: NURSE PRACTITIONER

## 2025-01-13 PROCEDURE — 83735 ASSAY OF MAGNESIUM: CPT | Performed by: INTERNAL MEDICINE

## 2025-01-13 PROCEDURE — 85027 COMPLETE CBC AUTOMATED: CPT | Performed by: INTERNAL MEDICINE

## 2025-01-13 PROCEDURE — 99239 HOSP IP/OBS DSCHRG MGMT >30: CPT | Performed by: PHYSICIAN ASSISTANT

## 2025-01-13 RX ADMIN — METRONIDAZOLE 500 MG: 500 INJECTION, SOLUTION INTRAVENOUS at 07:08

## 2025-01-13 RX ADMIN — VONOPRAZAN FUMARATE 20 MG: 26.72 TABLET ORAL at 05:41

## 2025-01-13 RX ADMIN — SODIUM CHLORIDE 125 ML/HR: 0.9 INJECTION, SOLUTION INTRAVENOUS at 02:42

## 2025-01-13 RX ADMIN — FLUOXETINE 10 MG: 10 CAPSULE ORAL at 08:56

## 2025-01-13 RX ADMIN — SIMETHICONE 80 MG: 80 TABLET, CHEWABLE ORAL at 07:08

## 2025-01-13 NOTE — CONSULTS
Consultation - Gastroenterology   Name: Meche Lerma 59 y.o. female I MRN: [id number]  Unit/Bed#: S -01 I Date of Admission: 1/10/2025   Date of Service: 1/13/2025 I Hospital Day: 3   Inpatient consult to gastroenterology  Consult performed by: AZAEL Chen  Consult ordered by: Padmini Melton MD        Physician Requesting Evaluation: Alisa Cagle MD   Reason for Evaluation / Principal Problem: Diverticulitis, chronic bloating    Assessment & Plan  Acute diverticulitis  59 y.o. female with past medical history of GERD, anxiety, and colon polyps who presents with report of left lower quadrant abdominal pain which started 1/1/2025.  CT done 1/10 showed acute uncomplicated sigmoid diverticulitis.    -Low fiber low residue diet for 2 weeks then high-fiber diet.  Patient reports that nutritionist did come and see her and reviewed low fiber diet.  -Continue Rocephin and Flagyl May transition to oral antibiotics at discharge  -Follow-up with GI (patient sees Dr. Correa) as outpatient will need colonoscopy in approximately 6 to 8 weeks for further evaluation  Gastroesophageal reflux disease without esophagitis  Patient reports history of GERD associated with abdominal pain which has been ongoing despite being on multiple medications in the past including Nexium and pantoprazole for which she experienced side effects.  Patient was also on famotidine with no improvement and is currently  vonoprazan with no improvement in symptoms.  Per medical records patient had a previous EGD and Bravo capsule with DeMeester score of 4.8 which does not reflect significant acid reflux.  No biopsies done at time of EGD.  Patient reports she adheres to antireflux diet and measures but continues to experience acid reflux and abdominal bloating daily.  Patient also reports previous SIBO test which was negative.    -Follow-up with Dr. Correa as outpatient, patient reports that she has an appointment on Wednesday with one of  his advanced practitioners.  -Recommend stool for H. pylori as outpatient once PPI can be held for 2 weeks.  No biopsies were done during most recent EGD to rule out H. pylori as contributing factor.  -Continue to follow antireflux diet and measures  -Continue Vonoprazan  -Recommend outpatient barium esophagram for further evaluation of esophageal motility to see if this is contributing to her symptoms.  Offered to order  test for the patient she said she will discuss it when she follows up with her GI provider as an outpatient.  Review of records show that esophageal manometry was ordered but patient informs me that this was placed on hold by her GI provider.  -Recommend following up with advanced endoscopy for further recommendations and possible endoscopic fundoplication.        History of Present Illness   HPI:  Meche Lerma is a 59 y.o. female with past medical history of GERD, anxiety, and colon polyps who presents with report of left lower quadrant abdominal pain which started 1/1/2025.  Patient reports that symptoms were getting worse so she came to the ED for further evaluation.CT abdomen and pelvis with contrast showed acute uncomplicated sigmoid diverticulitis no abscess or free air.  Stool for C. difficile and bacteria panel negative for infection.  Blood cultures no growth in 48 hours.  Lab work on admission showed positive leukocytosis white blood count 17.56 .  Leukocytosis has resolved white blood count 9.26 today.  Patient reports abdominal pain has since resolved.    GI was consulted due to ongoing GERD and abdominal bloating.  Review of medical records show that patient follows with Dr. Correa.  Patient reports continued acid reflux on a daily basis despite being on medication.  Patient has had a previous EGD and Bravo but no biopsies were done at that time. Patient has also been on Nexium, and  pantoprazole but experienced side effects from the medication so with started on vonoprazan with no  improvement in symptoms.  Patient reports she was also on famotidine in the past with no improvement in symptom.  patient reports she has also changed her diet and has been following antireflux diet and measures with no improvement.  Patient had a Bravo capsule with DeMeester score of 4.8 which does not reflect significant acid reflux.  Patient has not seen an advanced practitioner concerning further evaluation of symptoms  She reported  previous SIBO testing done which was negative.    Review of Systems   Constitutional:  Negative for chills and fever.   HENT:  Negative for ear pain and sore throat.    Eyes:  Negative for pain and visual disturbance.   Respiratory:  Negative for cough and shortness of breath.    Cardiovascular:  Negative for chest pain and palpitations.   Gastrointestinal:  Positive for abdominal pain. Negative for vomiting.   Genitourinary:  Negative for dysuria and hematuria.   Musculoskeletal:  Negative for arthralgias and back pain.   Skin:  Negative for color change and rash.   Neurological:  Negative for seizures and syncope.   All other systems reviewed and are negative.    I have reviewed the patient's PMH, PSH, Social History, Family History, Meds, and Allergies  Historical Information   Past Medical History:   Diagnosis Date    GERD (gastroesophageal reflux disease)      Past Surgical History:   Procedure Laterality Date    BUNIONECTOMY Right     COLONOSCOPY       Social History     Tobacco Use    Smoking status: Never    Smokeless tobacco: Never   Substance and Sexual Activity    Alcohol use: Yes     Alcohol/week: 1.0 standard drink of alcohol     Types: 1 Glasses of wine per week     Comment: socially    Drug use: Never    Sexual activity: Not on file     E-Cigarette/Vaping     E-Cigarette/Vaping Substances     Family History   Problem Relation Age of Onset    Hypertension Mother     Colon polyps Father     Heart disease Father     Colon cancer Paternal Grandmother      Social History      Tobacco Use    Smoking status: Never    Smokeless tobacco: Never   Substance and Sexual Activity    Alcohol use: Yes     Alcohol/week: 1.0 standard drink of alcohol     Types: 1 Glasses of wine per week     Comment: socially    Drug use: Never    Sexual activity: Not on file       Current Facility-Administered Medications:     acetaminophen (TYLENOL) tablet 650 mg, Q6H PRN    calcium carbonate (TUMS) chewable tablet 1,000 mg, Daily PRN    ceftriaxone (ROCEPHIN) 1 g/50 mL in dextrose IVPB, Q24H, Last Rate: 1,000 mg (01/12/25 1711)    FLUoxetine (PROzac) capsule 10 mg, Daily    heparin (porcine) subcutaneous injection 5,000 Units, Q8H CAMILLE    metroNIDAZOLE (FLAGYL) IVPB (premix) 500 mg 100 mL, Q8H, Last Rate: 500 mg (01/13/25 0708)    ondansetron (ZOFRAN) injection 4 mg, Q6H PRN    simethicone (MYLICON) chewable tablet 80 mg, 4x Daily (with meals and at bedtime)    sodium chloride 0.9 % infusion, Continuous, Last Rate: 125 mL/hr (01/13/25 0242)    Vonoprazan Fumarate TABS 20 mg, Daily Before Breakfast    Prior to Admission Medications   Prescriptions Last Dose Informant Patient Reported? Taking?   Ascorbic Acid (VITAMIN C PO)  Self Yes No   FLUoxetine (PROzac) 10 mg capsule  Self Yes No   Sig: Take 10 mg by mouth daily   Magnesium 125 MG CAPS  Self Yes No   Omega-3 Fatty Acids (Omega-3 Fish Oil) 1000 MG CAPS  Self Yes No   Sig: Fish Oil   Vonoprazan Fumarate 20 MG TABS   No No   Sig: Take 20 mg by mouth in the morning   Xiidra 5 % op solution  Self Yes No   cyclobenzaprine (FLEXERIL) 5 mg tablet  Self Yes No   Sig: Take 5 mg by mouth 3 (three) times a day as needed for muscle spasms   esomeprazole (NexIUM) 40 MG capsule   No No   Sig: Take 1 capsule (40 mg total) by mouth daily before breakfast   famotidine (PEPCID) 20 mg tablet   No No   Sig: Take 1 tablet (20 mg total) by mouth 2 (two) times a day   naproxen sodium (Aleve) 220 MG tablet  Self Yes No   Sig: Take 1 tablet every 12 hours by oral route as needed.    Patient taking differently: As needed   pantoprazole (PROTONIX) 40 mg tablet  Self No No   Sig: TAKE 1 TABLET BY MOUTH 2 TIMES A DAY BEFORE MEALS.   Patient not taking: Reported on 11/26/2024   polyethylene glycol (MiraLax) 17 GM/SCOOP powder  Self Yes No      Facility-Administered Medications: None       Objective :  Temp:  [97.9 °F (36.6 °C)-98.3 °F (36.8 °C)] 98.3 °F (36.8 °C)  HR:  [51-59] 59  BP: (109-125)/(68-76) 125/76  Resp:  [16-18] 18  SpO2:  [97 %-100 %] 97 %  O2 Device: None (Room air)    Physical Exam  Vitals and nursing note reviewed.   Constitutional:       General: She is not in acute distress.     Appearance: She is well-developed.   HENT:      Head: Normocephalic and atraumatic.   Eyes:      Conjunctiva/sclera: Conjunctivae normal.   Cardiovascular:      Rate and Rhythm: Normal rate and regular rhythm.      Pulses: Normal pulses.      Heart sounds: Normal heart sounds. No murmur heard.  Pulmonary:      Effort: Pulmonary effort is normal. No respiratory distress.      Breath sounds: Normal breath sounds. No stridor. No wheezing, rhonchi or rales.   Abdominal:      General: Bowel sounds are normal. There is no distension.      Palpations: Abdomen is soft. There is no mass.      Tenderness: There is no abdominal tenderness. There is no guarding or rebound.   Musculoskeletal:         General: No swelling.      Cervical back: Neck supple.      Right lower leg: No edema.      Left lower leg: No edema.   Skin:     General: Skin is warm and dry.      Capillary Refill: Capillary refill takes less than 2 seconds.      Coloration: Skin is not jaundiced or pale.   Neurological:      Mental Status: She is alert and oriented to person, place, and time.   Psychiatric:         Mood and Affect: Mood normal.           Lab Results: I have reviewed the following results:CBC/BMP:   .     01/13/25  0456   WBC 9.26   HGB 11.4*   HCT 34.9      SODIUM 142   K 3.7   *   CO2 23   BUN 10   CREATININE 0.87    GLUC 84   MG 1.7*   PHOS 3.3        Imaging Results Review: I reviewed radiology reports from this admission including: CT abdomen/pelvis.

## 2025-01-13 NOTE — DISCHARGE SUMMARY
Discharge Summary - Hospitalist   Name: Meche Lerma 59 y.o. female I MRN: [id number]  Unit/Bed#: S -01 I Date of Admission: 1/10/2025   Date of Service: 1/13/2025 I Hospital Day: 3     Assessment & Plan  Sepsis (HCC)  Presented with leukocytosis and tachycardia in the setting of diverticulitis (see below) -> SIRS criteria now normalized  Monitor vitals and maintain hemodynamics -> goal MAP > 65  Collected blood cultures remain negative thus far  Baseline lactic acid and serial procalcitonins are normal  Remains resolved on day of discharge  Acute diverticulitis  Presents with abdominal pain over the last week and a half coupled with nausea  CT imaging with evidence of sigmoid diverticulitis  Continue IV Rocephin/Flagyl - received 3 days inpatient, complete a total of 10 days of abx with Augmentin  PRN pain/emesis control  C. difficile PCR and enteric stool culture negative   Continue IV fluid hydration  Monitor/replete electrolytes, if necessary, from insensible losses  Tolerating low fiber diet, cont low fiber for the next 2 weeks   Patient already has GI apt scheduled this Wednesday, she was encouraged to keep this appt and they will likely schedule her for colonoscopy in 6-8 weeks  Anxiety  Continue Prozac  History of colonic polyps  History of colonic polyps status post excision follows outpatient gastroenterology (Dr. Pelayo)  Gastroesophageal reflux disease without esophagitis  Continue home regimen (takes nonformulary Vonoprazan)     Medical Problems       Resolved Problems  Date Reviewed: 1/10/2025   None       Discharging Physician / Practitioner: Gale Gray PA-C  PCP: Loretta Abrams MD  Admission Date:   Admission Orders (From admission, onward)       Ordered        01/10/25 1631  INPATIENT ADMISSION  Once                          Discharge Date: 01/13/25    Consultations During Hospital Stay:  none    Procedures Performed:   none    Significant Findings / Test Results:     CT abdomen pelvis  with contrast   Final Result      Acute uncomplicated sigmoid diverticulitis. No abscess or free air. Small volume of simple pelvic free fluid noted.      The study was marked in EPIC for immediate notification.         Workstation performed: [id number]               Incidental Findings:   none    Test Results Pending at Discharge (will require follow up):   none     Outpatient Tests Requested:  none    Complications:  none    Reason for Admission: diverticulitis    Hospital Course:   Meche Lerma is a 59 y.o. female patient who originally presented to the hospital on 1/10/2025 due to left lower quadrant abdominal pain.  She was found to have acute sigmoid diverticulitis.  She has never experienced this in the past.  She tolerated slow advancement in diet.  Was placed on ceftriaxone and Flagyl for her duration of stay and received 3 days of IV antibiotics total.  She reports feeling well today and denies any abdominal pain.  Her bowel movements are becoming more formed but are not back to baseline.  She reports wishing to go home today and has an already a scheduled appointment with GI on 1/15.      Please see above list of diagnoses and related plan for additional information.     Condition at Discharge: good    Discharge Day Visit / Exam:   Subjective:  patient reports eating her breakfast this AM without issue. Denies nausea, vomiting, abdominal pain.   Vitals: Blood Pressure: 125/76 (01/13/25 0740)  Pulse: 59 (01/13/25 0740)  Temperature: 98.3 °F (36.8 °C) (01/13/25 0740)  Temp Source: Oral (01/13/25 0740)  Respirations: 18 (01/13/25 0740)  Weight - Scale: 70.4 kg (155 lb 3.3 oz) (01/13/25 0538)  SpO2: 97 % (01/13/25 0740)  Physical Exam  Vitals and nursing note reviewed.   Constitutional:       General: She is not in acute distress.     Appearance: She is not ill-appearing.   Cardiovascular:      Rate and Rhythm: Normal rate and regular rhythm.      Pulses: Normal pulses.      Heart sounds: Normal heart  sounds.   Pulmonary:      Effort: Pulmonary effort is normal.      Breath sounds: Normal breath sounds. No wheezing, rhonchi or rales.   Abdominal:      General: Bowel sounds are normal.      Palpations: Abdomen is soft.      Tenderness: There is no abdominal tenderness. There is no guarding or rebound.   Musculoskeletal:      Right lower leg: No edema.      Left lower leg: No edema.   Neurological:      Mental Status: She is alert and oriented to person, place, and time. Mental status is at baseline.   Psychiatric:         Mood and Affect: Mood normal.         Behavior: Behavior normal.          Discussion with Family: Updated  () at bedside.    Discharge instructions/Information to patient and family:   See after visit summary for information provided to patient and family.      Provisions for Follow-Up Care:  See after visit summary for information related to follow-up care and any pertinent home health orders.      Mobility at time of Discharge:   Basic Mobility Inpatient Raw Score: 24  JH-HLM Goal: 8: Walk 250 feet or more  JH-HLM Achieved: 8: Walk 250 feet ot more  HLM Goal achieved. Continue to encourage appropriate mobility.     Disposition:   Home    Planned Readmission: none    Discharge Medications:  See after visit summary for reconciled discharge medications provided to patient and/or family.      Administrative Statements   Discharge Statement:  I have spent a total time of 50 minutes in caring for this patient on the day of the visit/encounter. >30 minutes of time was spent on: Diagnostic results, Prognosis, Risks and benefits of tx options, Instructions for management, Patient and family education, Importance of tx compliance, Risk factor reductions, Impressions, Counseling / Coordination of care, Documenting in the medical record, Reviewing / ordering tests, medicine, procedures  , and Communicating with other healthcare professionals .    **Please Note: This note may have been  constructed using a voice recognition system**

## 2025-01-13 NOTE — ASSESSMENT & PLAN NOTE
Patient reports history of GERD associated with abdominal pain which has been ongoing despite being on multiple medications in the past including Nexium and pantoprazole for which she experienced side effects.  Patient was also on famotidine with no improvement and is currently  vonoprazan with no improvement in symptoms.  Per medical records patient had a previous EGD and Bravo capsule with DeMeester score of 4.8 which does not reflect significant acid reflux.  No biopsies done at time of EGD.  Patient reports she adheres to antireflux diet and measures but continues to experience acid reflux and abdominal bloating daily.  Patient also reports previous SIBO test which was negative.    -Follow-up with Dr. Correa as outpatient, patient reports that she has an appointment on Wednesday with one of his advanced practitioners.  -Recommend stool for H. pylori as outpatient once PPI can be held for 2 weeks.  No biopsies were done during most recent EGD to rule out H. pylori as contributing factor.  -Continue to follow antireflux diet and measures  -Continue Vonoprazan  -Recommend outpatient barium esophagram for further evaluation of esophageal motility to see if this is contributing to her symptoms.  Offered to order  test for the patient she said she will discuss it when she follows up with her GI provider as an outpatient.  Review of records show that esophageal manometry was ordered but patient informs me that this was placed on hold by her GI provider.  -Recommend following up with advanced endoscopy for further recommendations and possible endoscopic fundoplication.

## 2025-01-13 NOTE — ASSESSMENT & PLAN NOTE
59 y.o. female with past medical history of GERD, anxiety, and colon polyps who presents with report of left lower quadrant abdominal pain which started 1/1/2025.  CT done 1/10 showed acute uncomplicated sigmoid diverticulitis.    -Low fiber low residue diet for 2 weeks then high-fiber diet.  Patient reports that nutritionist did come and see her and reviewed low fiber diet.  -Continue Rocephin and Flagyl May transition to oral antibiotics at discharge  -Follow-up with GI (patient sees Dr. Correa) as outpatient will need colonoscopy in approximately 6 to 8 weeks for further evaluation

## 2025-01-13 NOTE — ASSESSMENT & PLAN NOTE
Presents with abdominal pain over the last week and a half coupled with nausea  CT imaging with evidence of sigmoid diverticulitis  Continue IV Rocephin/Flagyl - received 3 days inpatient, complete a total of 10 days of abx with Augmentin  PRN pain/emesis control  C. difficile PCR and enteric stool culture negative   Continue IV fluid hydration  Monitor/replete electrolytes, if necessary, from insensible losses  Tolerating low fiber diet, cont low fiber for the next 2 weeks   Patient already has GI apt scheduled this Wednesday, she was encouraged to keep this appt and they will likely schedule her for colonoscopy in 6-8 weeks

## 2025-01-13 NOTE — ASSESSMENT & PLAN NOTE
Presented with leukocytosis and tachycardia in the setting of diverticulitis (see below) -> SIRS criteria now normalized  Monitor vitals and maintain hemodynamics -> goal MAP > 65  Collected blood cultures remain negative thus far  Baseline lactic acid and serial procalcitonins are normal  Remains resolved on day of discharge

## 2025-01-14 NOTE — UTILIZATION REVIEW
NOTIFICATION OF ADMISSION DISCHARGE   This is a Notification of Discharge from [facility]. Please be advised that this patient has been discharge from our facility. Below you will find the admission and discharge date and time including the patient’s disposition.   UTILIZATION REVIEW CONTACT:  Shantel Brown  Utilization   Network Utilization Review Department  Phone: [phone] x carefully listen to the prompts. All voicemails are confidential.  Email: [email or web address]     ADMISSION INFORMATION  PRESENTATION DATE: 1/10/2025  1:30 PM  OBERVATION ADMISSION DATE: N/A  INPATIENT ADMISSION DATE: 1/10/25  4:31 PM   DISCHARGE DATE: 1/13/2025 12:38 PM   DISPOSITION:Home/Self Care    Network Utilization Review Department  ATTENTION: Please call with any questions or concerns to [phone] and carefully listen to the prompts so that you are directed to the right person. All voicemails are confidential.   For Discharge needs, contact Care Management DC Support Team at [phone] opt. 2  Send all requests for admission clinical reviews, approved or denied determinations and any other requests to dedicated fax number below belonging to the campus where the patient is receiving treatment. List of dedicated fax numbers for the Facilities:  FACILITY NAME UR FAX NUMBER   ADMISSION DENIALS (Administrative/Medical Necessity) [phone]   DISCHARGE SUPPORT TEAM ([facility]) [phone]   PARENT CHILD HEALTH (Maternity/NICU/Pediatrics) [phone]   [facility] [phone]   [facility] [phone]   [facility] [phone]   [facility] [phone]   Pending sale to [facility] [phone]   [facility] [phone]   [facility] [phone]   [facility] [phone]    [facility] [phone]   [facility] [phone]   [facility] [phone]   [facility] [phone]

## 2025-01-15 ENCOUNTER — PREP FOR PROCEDURE (OUTPATIENT)
Dept: GASTROENTEROLOGY | Facility: CLINIC | Age: 60
End: 2025-01-15

## 2025-01-15 ENCOUNTER — OFFICE VISIT (OUTPATIENT)
Dept: GASTROENTEROLOGY | Facility: CLINIC | Age: 60
End: 2025-01-15
Payer: COMMERCIAL

## 2025-01-15 VITALS
TEMPERATURE: 97.5 F | OXYGEN SATURATION: 100 % | BODY MASS INDEX: 24.33 KG/M2 | HEIGHT: 67 IN | DIASTOLIC BLOOD PRESSURE: 78 MMHG | WEIGHT: 155 LBS | SYSTOLIC BLOOD PRESSURE: 126 MMHG | HEART RATE: 69 BPM

## 2025-01-15 DIAGNOSIS — R14.0 BLOATING: ICD-10-CM

## 2025-01-15 DIAGNOSIS — K59.01 SLOW TRANSIT CONSTIPATION: ICD-10-CM

## 2025-01-15 DIAGNOSIS — K21.9 GASTROESOPHAGEAL REFLUX DISEASE WITHOUT ESOPHAGITIS: Primary | ICD-10-CM

## 2025-01-15 DIAGNOSIS — K57.92 ACUTE DIVERTICULITIS: ICD-10-CM

## 2025-01-15 DIAGNOSIS — K59.01 SLOW TRANSIT CONSTIPATION: Primary | ICD-10-CM

## 2025-01-15 LAB
BACTERIA BLD CULT: NORMAL
BACTERIA BLD CULT: NORMAL

## 2025-01-15 PROCEDURE — 99213 OFFICE O/P EST LOW 20 MIN: CPT | Performed by: PHYSICIAN ASSISTANT

## 2025-01-15 RX ORDER — SUCRALFATE 1 G/1
1 TABLET ORAL 4 TIMES DAILY
Qty: 120 TABLET | Refills: 0 | Status: SHIPPED | OUTPATIENT
Start: 2025-01-15

## 2025-01-15 RX ORDER — SUCRALFATE 1 G/1
TABLET ORAL
COMMUNITY
Start: 2025-01-10

## 2025-01-15 RX ORDER — SODIUM CHLORIDE, SODIUM LACTATE, POTASSIUM CHLORIDE, CALCIUM CHLORIDE 600; 310; 30; 20 MG/100ML; MG/100ML; MG/100ML; MG/100ML
125 INJECTION, SOLUTION INTRAVENOUS CONTINUOUS
OUTPATIENT
Start: 2025-01-15

## 2025-01-15 NOTE — PROGRESS NOTES
Name: Meche Lerma      : 1965      MRN: [id number]  Encounter Provider: Kelsey Jones PA-C  Encounter Date: 1/15/2025   Encounter department: [facility] [place]  :  Assessment & Plan  Gastroesophageal reflux disease without esophagitis  Patient will begin Carafate 3-4 times a day.    Slow transit constipation  Patient will continue MiraLAX daily.       Bloating  Patient will begin peppermint oil as well as Gas-X.    Will plan to update laboratories to include thyroid and hormones.       Acute diverticulitis  Patient will complete a full 7 days of Augmentin.    Patient will be scheduled for colonoscopy in 6 to 8 weeks.    Patient will continue a low fiber diet and transition to high fiber in the next week to 2 weeks.     Patient and I have had a long discussion in the office today about abdominal bloating.  Differential diagnosis reviewed.  Will have patient follow-up after colonoscopy is complete.      History of Present Illness   HPI  Meche Lerma is a 59 y.o. female who presents to the GI clinic today for follow-up.    Patient reports that she initially called to make this appointment to discuss her abdominal bloating  And reflux.  Unfortunately she ended up in the emergency department at [facility] and was diagnosed with acute diverticulitis.  Patient was admitted from 1 10- on IV antibiotics.  She was discharged home with 7 days of Augmentin.  Patient does have a history of slow transit constipation where she does take MiraLAX daily.  Unfortunately patient is still really suffering with her abdominal bloating.  Patient does have reflux but is failed all PPI therapy due to ineffectiveness or side effects.  Patient reports that she also failed famotidine due to side effects.  Patient reports that she has had SIBO testing which was negative.  She has also went on a strict gluten and dairy free diet for 2 months without any change in her symptoms.  She  "reports that she did low-carb for about a month again with no change in her symptoms.  She reports that her bloating that she experiences blood pressure on the upper part of her abdomen and into her lungs.  She reports a constant burning in her mouth.  She has not tried Carafate therapy.  Patient most recently did undergo an EGD with [facility].  There was no evidence of esophagitis or gastritis at that time.  Patient's Terry and DeMeester score was 4.8 which did not indicate significant acid reflux.  Patient reports that she exercises daily and eats a very well-balanced diet.         Objective   /78 (BP Location: Right arm, Patient Position: Sitting, Cuff Size: Standard)   Pulse 69   Temp 97.5 °F (36.4 °C) (Temporal)   Ht 5' 7\" (1.702 m)   Wt 70.3 kg (155 lb)   SpO2 100%   BMI 24.28 kg/m²            "

## 2025-01-15 NOTE — PATIENT INSTRUCTIONS
Scheduled date of colonoscopy (as of today):3/3/25  Physician performing colonoscopy:Madeline  Location of colonoscopy:[place]  Bowel prep reviewed with patient:Walter/Miralax  Instructions reviewed with patient by:Chet baum  Clearances:  none

## 2025-01-15 NOTE — ASSESSMENT & PLAN NOTE
Patient will complete a full 7 days of Augmentin.    Patient will be scheduled for colonoscopy in 6 to 8 weeks.    Patient will continue a low fiber diet and transition to high fiber in the next week to 2 weeks.     Patient and I have had a long discussion in the office today about abdominal bloating.  Differential diagnosis reviewed.  Will have patient follow-up after colonoscopy is complete.

## 2025-01-29 DIAGNOSIS — R14.0 BLOATING: Primary | ICD-10-CM

## 2025-01-29 RX ORDER — FLUCONAZOLE 100 MG/1
100 TABLET ORAL DAILY
Qty: 5 TABLET | Refills: 0 | Status: SHIPPED | OUTPATIENT
Start: 2025-01-29 | End: 2025-02-03

## 2025-01-30 ENCOUNTER — APPOINTMENT (EMERGENCY)
Dept: CT IMAGING | Facility: HOSPITAL | Age: 60
DRG: 392 | End: 2025-01-30
Payer: COMMERCIAL

## 2025-01-30 ENCOUNTER — HOSPITAL ENCOUNTER (EMERGENCY)
Facility: HOSPITAL | Age: 60
Discharge: HOME | DRG: 392 | End: 2025-01-30
Attending: EMERGENCY MEDICINE
Payer: COMMERCIAL

## 2025-01-30 VITALS
BODY MASS INDEX: 23.86 KG/M2 | DIASTOLIC BLOOD PRESSURE: 64 MMHG | HEIGHT: 67 IN | SYSTOLIC BLOOD PRESSURE: 118 MMHG | OXYGEN SATURATION: 99 % | WEIGHT: 152 LBS | RESPIRATION RATE: 17 BRPM | HEART RATE: 84 BPM | TEMPERATURE: 98.8 F

## 2025-01-30 DIAGNOSIS — K57.92 ACUTE DIVERTICULITIS: ICD-10-CM

## 2025-01-30 DIAGNOSIS — K57.92 DIVERTICULITIS: Primary | ICD-10-CM

## 2025-01-30 DIAGNOSIS — K86.2 PANCREATIC CYST: ICD-10-CM

## 2025-01-30 LAB
ALBUMIN SERPL BCG-MCNC: 4 G/DL (ref 3.5–5)
ALP SERPL-CCNC: 49 U/L (ref 34–104)
ALT SERPL W P-5'-P-CCNC: 8 U/L (ref 7–52)
ANION GAP SERPL CALCULATED.3IONS-SCNC: 8 MMOL/L (ref 4–13)
AST SERPL W P-5'-P-CCNC: 13 U/L (ref 13–39)
BASOPHILS # BLD AUTO: 0.05 THOUSANDS/ΜL (ref 0–0.1)
BASOPHILS NFR BLD AUTO: 0 % (ref 0–1)
BILIRUB SERPL-MCNC: 0.91 MG/DL (ref 0.2–1)
BUN SERPL-MCNC: 17 MG/DL (ref 5–25)
CALCIUM SERPL-MCNC: 8.7 MG/DL (ref 8.4–10.2)
CHLORIDE SERPL-SCNC: 105 MMOL/L (ref 96–108)
CO2 SERPL-SCNC: 22 MMOL/L (ref 21–32)
CREAT SERPL-MCNC: 0.88 MG/DL (ref 0.6–1.3)
EOSINOPHIL # BLD AUTO: 0.03 THOUSAND/ΜL (ref 0–0.61)
EOSINOPHIL NFR BLD AUTO: 0 % (ref 0–6)
ERYTHROCYTE [DISTWIDTH] IN BLOOD BY AUTOMATED COUNT: 12.5 % (ref 11.6–15.1)
GFR SERPL CREATININE-BSD FRML MDRD: 72 ML/MIN/1.73SQ M
GLUCOSE SERPL-MCNC: 126 MG/DL (ref 65–140)
HCT VFR BLD AUTO: 38.9 % (ref 34.8–46.1)
HGB BLD-MCNC: 12.8 G/DL (ref 11.5–15.4)
HOLD SPECIMEN: NORMAL
IMM GRANULOCYTES # BLD AUTO: 0.06 THOUSAND/UL (ref 0–0.2)
IMM GRANULOCYTES NFR BLD AUTO: 0 % (ref 0–2)
LACTATE SERPL-SCNC: 1 MMOL/L (ref 0.5–2)
LIPASE SERPL-CCNC: 15 U/L (ref 11–82)
LYMPHOCYTES # BLD AUTO: 2.03 THOUSANDS/ΜL (ref 0.6–4.47)
LYMPHOCYTES NFR BLD AUTO: 15 % (ref 14–44)
MCH RBC QN AUTO: 30.2 PG (ref 26.8–34.3)
MCHC RBC AUTO-ENTMCNC: 32.9 G/DL (ref 31.4–37.4)
MCV RBC AUTO: 92 FL (ref 82–98)
MONOCYTES # BLD AUTO: 1.12 THOUSAND/ΜL (ref 0.17–1.22)
MONOCYTES NFR BLD AUTO: 8 % (ref 4–12)
NEUTROPHILS # BLD AUTO: 10.5 THOUSANDS/ΜL (ref 1.85–7.62)
NEUTS SEG NFR BLD AUTO: 77 % (ref 43–75)
NRBC BLD AUTO-RTO: 0 /100 WBCS
PLATELET # BLD AUTO: 243 THOUSANDS/UL (ref 149–390)
PMV BLD AUTO: 9.9 FL (ref 8.9–12.7)
POTASSIUM SERPL-SCNC: 4 MMOL/L (ref 3.5–5.3)
PROT SERPL-MCNC: 6.9 G/DL (ref 6.4–8.4)
RBC # BLD AUTO: 4.24 MILLION/UL (ref 3.81–5.12)
SODIUM SERPL-SCNC: 135 MMOL/L (ref 135–147)
WBC # BLD AUTO: 13.79 THOUSAND/UL (ref 4.31–10.16)

## 2025-01-30 PROCEDURE — 83690 ASSAY OF LIPASE: CPT

## 2025-01-30 PROCEDURE — 87040 BLOOD CULTURE FOR BACTERIA: CPT | Performed by: EMERGENCY MEDICINE

## 2025-01-30 PROCEDURE — 36415 COLL VENOUS BLD VENIPUNCTURE: CPT

## 2025-01-30 PROCEDURE — 83605 ASSAY OF LACTIC ACID: CPT | Performed by: EMERGENCY MEDICINE

## 2025-01-30 PROCEDURE — 87154 CUL TYP ID BLD PTHGN 6+ TRGT: CPT | Performed by: EMERGENCY MEDICINE

## 2025-01-30 PROCEDURE — 80053 COMPREHEN METABOLIC PANEL: CPT

## 2025-01-30 PROCEDURE — 96365 THER/PROPH/DIAG IV INF INIT: CPT

## 2025-01-30 PROCEDURE — 74177 CT ABD & PELVIS W/CONTRAST: CPT

## 2025-01-30 PROCEDURE — 96375 TX/PRO/DX INJ NEW DRUG ADDON: CPT

## 2025-01-30 PROCEDURE — 85025 COMPLETE CBC W/AUTO DIFF WBC: CPT

## 2025-01-30 PROCEDURE — 99285 EMERGENCY DEPT VISIT HI MDM: CPT

## 2025-01-30 PROCEDURE — 99285 EMERGENCY DEPT VISIT HI MDM: CPT | Performed by: EMERGENCY MEDICINE

## 2025-01-30 RX ORDER — METRONIDAZOLE 500 MG/1
500 TABLET ORAL EVERY 8 HOURS SCHEDULED
Qty: 30 TABLET | Refills: 0 | Status: SHIPPED | OUTPATIENT
Start: 2025-01-30 | End: 2025-02-09

## 2025-01-30 RX ORDER — ONDANSETRON 2 MG/ML
4 INJECTION INTRAMUSCULAR; INTRAVENOUS ONCE
Status: COMPLETED | OUTPATIENT
Start: 2025-01-30 | End: 2025-01-30

## 2025-01-30 RX ORDER — LEVOFLOXACIN 750 MG/1
750 TABLET, FILM COATED ORAL EVERY 24 HOURS
Qty: 10 TABLET | Refills: 0 | Status: SHIPPED | OUTPATIENT
Start: 2025-01-30 | End: 2025-02-02

## 2025-01-30 RX ADMIN — IOHEXOL 85 ML: 350 INJECTION, SOLUTION INTRAVENOUS at 11:58

## 2025-01-30 RX ADMIN — ONDANSETRON 4 MG: 2 INJECTION INTRAMUSCULAR; INTRAVENOUS at 10:04

## 2025-01-30 RX ADMIN — Medication 1000 MG: at 11:52

## 2025-01-30 NOTE — ED PROVIDER NOTES
Time reflects when diagnosis was documented in both MDM as applicable and the Disposition within this note       Time User Action Codes Description Comment    1/30/2025  1:09 PM Abe Rios Add [K57.92] Diverticulitis     1/30/2025  1:12 PM Abe Rois Add [K57.92] Acute diverticulitis     1/30/2025  1:17 PM Abe Rios Add [K86.2] Pancreatic cyst           ED Disposition       ED Disposition   Discharge    Condition   Stable    Date/Time   u Jan 30, 2025  1:08 PM    Comment   Meche Lerma discharge to home/self care.                   Assessment & Plan       Medical Decision Making  Patient with sigmoid diverticulitis previous admission now with new finding of new foci approximately.  Recently finished antibiotic course with Augmentin.  Also followed up with gastroenterology as an outpatient.  They are recommending colonoscopy in 6 to 8 weeks.  Recommend she reach out to them and let them know however this new diverticulitis.  Will prescribe Flagyl 500 mg 3 times daily and levofloxacin however milligram daily for 10 days.  Also counseled her on pain management with Tylenol and NSAIDs.  She would prefer going home rather than admission.  Encourage patient to return to ED for prolonged or worsening symptoms.    Amount and/or Complexity of Data Reviewed  Labs: ordered.  Radiology: ordered.    Risk  Prescription drug management.             Medications   sodium chloride 0.9 % bolus 1,000 mL (1,000 mL Intravenous Not Given 1/30/25 1123)   ondansetron (ZOFRAN) injection 4 mg (4 mg Intravenous Given 1/30/25 1004)   ceftriaxone (ROCEPHIN) 1 g/50 mL in dextrose IVPB (0 mg Intravenous Stopped 1/30/25 1248)   iohexol (OMNIPAQUE) 350 MG/ML injection (SINGLE-DOSE) 85 mL (85 mL Intravenous Given 1/30/25 1158)       ED Risk Strat Scores            SBIRT 22yo+      Flowsheet Row Most Recent Value   Initial Alcohol Screen: US AUDIT-C     1. How often do you have a drink containing alcohol? 0 Filed at: 01/30/2025 0956   2.  How many drinks containing alcohol do you have on a typical day you are drinking?  0 Filed at: 01/30/2025 0956   3a. Male UNDER 65: How often do you have five or more drinks on one occasion? 0 Filed at: 01/30/2025 0956   3b. FEMALE Any Age, or MALE 65+: How often do you have 4 or more drinks on one occassion? 0 Filed at: 01/30/2025 0956   Audit-C Score 0 Filed at: 01/30/2025 0956   TONE: How many times in the past year have you...    Used an illegal drug or used a prescription medication for non-medical reasons? Never Filed at: 01/30/2025 0956              History of Present Illness       Chief Complaint   Patient presents with    Abdominal Pain     Was here for 3-4 days for Divertic about a month ago. Still having issues and yesterday started with stabbing pain again. Been just doing liquid diet but not helping.        Past Medical History:   Diagnosis Date    GERD (gastroesophageal reflux disease)       Past Surgical History:   Procedure Laterality Date    BUNIONECTOMY Right     COLONOSCOPY        Family History   Problem Relation Age of Onset    Hypertension Mother     Colon polyps Father     Heart disease Father     Colon cancer Paternal Grandmother       Social History     Tobacco Use    Smoking status: Never    Smokeless tobacco: Never   Substance Use Topics    Alcohol use: Yes     Alcohol/week: 1.0 standard drink of alcohol     Types: 1 Glasses of wine per week     Comment: socially    Drug use: Never      E-Cigarette/Vaping      E-Cigarette/Vaping Substances      I have reviewed and agree with the history as documented.     59-year-old female with past medical history of chronic constipation on daily MiraLAX and Carafate at home.  Recently was admitted on 1/10 for sigmoid diverticulitis and was treated with 3 days of IV antibiotics and 7 days oral.  She had follow-up with her gastroenterologist who recommended colonoscopy in 6 to 8 weeks.  She presents to the ED today with similar symptoms that initially  brought her in during her previous admission.  CT revealed new foci of acute diverticulitis proximal to previous.  She has abdominal tenderness in the suprapubic and lower left quadrant area.  She does have difficulty ambulating and sitting up however she feels her pain is controlled if lying still and with Tylenol.  No nausea or vomiting.  No diarrhea.      Abdominal Pain  Associated symptoms: no chest pain, no chills, no cough, no dysuria, no fever, no hematuria, no shortness of breath, no sore throat and no vomiting        Review of Systems   Constitutional:  Negative for chills and fever.   HENT:  Negative for ear pain and sore throat.    Respiratory:  Negative for cough and shortness of breath.    Cardiovascular:  Negative for chest pain and palpitations.   Gastrointestinal:  Positive for abdominal pain. Negative for vomiting.   Genitourinary:  Negative for dysuria and hematuria.   Musculoskeletal:  Negative for arthralgias and back pain.   Skin:  Negative for color change and rash.   Neurological:  Negative for seizures and syncope.   All other systems reviewed and are negative.          Objective       ED Triage Vitals [01/30/25 0956]   Temperature Pulse Blood Pressure Respirations SpO2 Patient Position - Orthostatic VS   98.8 °F (37.1 °C) 95 97/58 17 99 % Sitting      Temp Source Heart Rate Source BP Location FiO2 (%) Pain Score    Oral Monitor Right arm -- --      Vitals      Date and Time Temp Pulse SpO2 Resp BP Pain Score FACES Pain Rating User   01/30/25 1122 -- 84 -- -- 118/64 -- -- KB   01/30/25 0956 98.8 °F (37.1 °C) 95 99 % 17 97/58 -- -- KK            Physical Exam  Vitals and nursing note reviewed.   Constitutional:       General: She is not in acute distress.     Appearance: She is well-developed.   HENT:      Head: Normocephalic and atraumatic.   Eyes:      Conjunctiva/sclera: Conjunctivae normal.   Cardiovascular:      Rate and Rhythm: Normal rate and regular rhythm.      Heart sounds: No murmur  heard.  Pulmonary:      Effort: Pulmonary effort is normal. No respiratory distress.      Breath sounds: Normal breath sounds.   Abdominal:      Palpations: Abdomen is soft.      Tenderness: There is abdominal tenderness in the periumbilical area, suprapubic area and left lower quadrant.   Musculoskeletal:         General: No swelling.      Cervical back: Neck supple.   Skin:     General: Skin is warm and dry.      Capillary Refill: Capillary refill takes less than 2 seconds.   Neurological:      Mental Status: She is alert.   Psychiatric:         Mood and Affect: Mood normal.         Results Reviewed       Procedure Component Value Units Date/Time    Lactic acid, plasma (w/reflex if result > 2.0) [[id number]]  (Normal) Collected: 01/30/25 1146    Lab Status: Final result Specimen: Blood from Arm, Right Updated: 01/30/25 1216     LACTIC ACID 1.0 mmol/L     Narrative:      Result may be elevated if tourniquet was used during collection.    Dix draw [[id number]] Collected: 01/30/25 0958    Lab Status: Final result Specimen: Blood from Arm, Left Updated: 01/30/25 1201    Narrative:      The following orders were created for panel order Dix draw.  Procedure                               Abnormality         Status                     ---------                               -----------         ------                     Light Blue Top on hold[[id number]]                           Final result               Green / Black tube on hold[[id number]]                       Final result                 Please view results for these tests on the individual orders.    Blood culture #2 [[id number]] Collected: 01/30/25 1146    Lab Status: In process Specimen: Blood from Arm, Right Updated: 01/30/25 1150    Blood culture #1 [[id number]] Collected: 01/30/25 1146    Lab Status: In process Specimen: Blood from Arm, Left Updated: 01/30/25 1150    Comprehensive metabolic panel [[id number]] Collected: 01/30/25 0958    Lab Status: Final  result Specimen: Blood from Arm, Left Updated: 01/30/25 1025     Sodium 135 mmol/L      Potassium 4.0 mmol/L      Chloride 105 mmol/L      CO2 22 mmol/L      ANION GAP 8 mmol/L      BUN 17 mg/dL      Creatinine 0.88 mg/dL      Glucose 126 mg/dL      Calcium 8.7 mg/dL      AST 13 U/L      ALT 8 U/L      Alkaline Phosphatase 49 U/L      Total Protein 6.9 g/dL      Albumin 4.0 g/dL      Total Bilirubin 0.91 mg/dL      eGFR 72 ml/min/1.73sq m     Narrative:      National Kidney Disease Foundation guidelines for Chronic Kidney Disease (CKD):     Stage 1 with normal or high GFR (GFR > 90 mL/min/1.73 square meters)    Stage 2 Mild CKD (GFR = 60-89 mL/min/1.73 square meters)    Stage 3A Moderate CKD (GFR = 45-59 mL/min/1.73 square meters)    Stage 3B Moderate CKD (GFR = 30-44 mL/min/1.73 square meters)    Stage 4 Severe CKD (GFR = 15-29 mL/min/1.73 square meters)    Stage 5 End Stage CKD (GFR <15 mL/min/1.73 square meters)  Note: GFR calculation is accurate only with a steady state creatinine    Lipase [[id number]]  (Normal) Collected: 01/30/25 0958    Lab Status: Final result Specimen: Blood from Arm, Left Updated: 01/30/25 1025     Lipase 15 u/L     CBC and differential [[id number]]  (Abnormal) Collected: 01/30/25 0958    Lab Status: Final result Specimen: Blood from Arm, Left Updated: 01/30/25 1018     WBC 13.79 Thousand/uL      RBC 4.24 Million/uL      Hemoglobin 12.8 g/dL      Hematocrit 38.9 %      MCV 92 fL      MCH 30.2 pg      MCHC 32.9 g/dL      RDW 12.5 %      MPV 9.9 fL      Platelets 243 Thousands/uL      nRBC 0 /100 WBCs      Segmented % 77 %      Immature Grans % 0 %      Lymphocytes % 15 %      Monocytes % 8 %      Eosinophils Relative 0 %      Basophils Relative 0 %      Absolute Neutrophils 10.50 Thousands/µL      Absolute Immature Grans 0.06 Thousand/uL      Absolute Lymphocytes 2.03 Thousands/µL      Absolute Monocytes 1.12 Thousand/µL      Eosinophils Absolute 0.03 Thousand/µL      Basophils Absolute 0.05  "Thousands/µL             CT abdomen pelvis with contrast   Final Interpretation by Kory Hughes MD (01/30 1241)   Since January 10, 2025   1.  New acute diverticulitis in the distal descending colon the left lower abdomen immediately proximal to the prior area of diverticulitis. The prior acute diverticulitis has slightly decreased in severity. No abdominopelvic abscess.   2.  Unchanged 0.8 cm pancreatic cyst. Per review of the medical record, a \"0.7 cm low-attenuation lesion [was present] in the tail the pancreas\" on the July 20, 2016 CT performed at [facility]. Comparison to remote prior imaging is advised to    assess the need for continued follow-up.      The study was marked in EPIC for immediate notification.      Workstation performed: [id number]             Procedures    ED Medication and Procedure Management   Prior to Admission Medications   Prescriptions Last Dose Informant Patient Reported? Taking?   FLUoxetine (PROzac) 10 mg capsule  Self Yes No   Sig: Take 10 mg by mouth daily   Magnesium 125 MG CAPS  Self Yes No   Omega-3 Fatty Acids (Omega-3 Fish Oil) 1000 MG CAPS  Self Yes No   Sig: Fish Oil   Vonoprazan Fumarate 20 MG TABS  Self No No   Sig: Take 20 mg by mouth in the morning   Xiidra 5 % op solution  Self Yes No   cyclobenzaprine (FLEXERIL) 5 mg tablet  Self Yes No   Sig: Take 5 mg by mouth 3 (three) times a day as needed for muscle spasms   fluconazole (DIFLUCAN) 100 mg tablet   No No   Sig: Take 1 tablet (100 mg total) by mouth daily for 5 days   polyethylene glycol (MiraLax) 17 GM/SCOOP powder  Self Yes No   sucralfate (CARAFATE) 1 g tablet  Self Yes No   sucralfate (CARAFATE) 1 g tablet   No No   Sig: Take 1 tablet (1 g total) by mouth 4 (four) times a day      Facility-Administered Medications: None     Discharge Medication List as of 1/30/2025  1:18 PM        START taking these medications    Details   levofloxacin (LEVAQUIN) 750 mg tablet Take 1 tablet (750 mg total) by " mouth every 24 hours for 10 days, Starting Thu 1/30/2025, Until Sun 2/9/2025, Normal      metroNIDAZOLE (FLAGYL) 500 mg tablet Take 1 tablet (500 mg total) by mouth every 8 (eight) hours for 10 days, Starting Thu 1/30/2025, Until Sun 2/9/2025, Normal           CONTINUE these medications which have NOT CHANGED    Details   cyclobenzaprine (FLEXERIL) 5 mg tablet Take 5 mg by mouth 3 (three) times a day as needed for muscle spasms, Historical Med      fluconazole (DIFLUCAN) 100 mg tablet Take 1 tablet (100 mg total) by mouth daily for 5 days, Starting Wed 1/29/2025, Until Mon 2/3/2025, Normal      FLUoxetine (PROzac) 10 mg capsule Take 10 mg by mouth daily, Starting Mon 5/22/2023, Historical Med      Magnesium 125 MG CAPS Historical Med      Omega-3 Fatty Acids (Omega-3 Fish Oil) 1000 MG CAPS Fish Oil, Historical Med      polyethylene glycol (MiraLax) 17 GM/SCOOP powder Historical Med      !! sucralfate (CARAFATE) 1 g tablet Historical Med      !! sucralfate (CARAFATE) 1 g tablet Take 1 tablet (1 g total) by mouth 4 (four) times a day, Starting Wed 1/15/2025, Normal      Vonoprazan Fumarate 20 MG TABS Take 20 mg by mouth in the morning, Starting Thu 12/19/2024, Normal      Xiidra 5 % op solution Historical Med       !! - Potential duplicate medications found. Please discuss with provider.        No discharge procedures on file.  ED SEPSIS DOCUMENTATION   Time reflects when diagnosis was documented in both MDM as applicable and the Disposition within this note       Time User Action Codes Description Comment    1/30/2025  1:09 PM Abe Rios [K57.92] Diverticulitis     1/30/2025  1:12 PM Abe Rios Add [K57.92] Acute diverticulitis     1/30/2025  1:17 PM Abe Rios [K86.2] Pancreatic cyst                  Abe Rios MD  01/30/25 6558

## 2025-01-30 NOTE — ED ATTENDING ATTESTATION
1/30/2025  ICecy MD, saw and evaluated the patient. I have discussed the patient with the resident/non-physician practitioner and agree with the resident's/non-physician practitioner's findings, Plan of Care, and MDM as documented in the resident's/non-physician practitioner's note, except where noted. All available labs and Radiology studies were reviewed.  I was present for key portions of any procedure(s) performed by the resident/non-physician practitioner and I was immediately available to provide assistance.       At this point I agree with the current assessment done in the Emergency Department.  I have conducted an independent evaluation of this patient a history and physical is as follows:    59-year-old female presenting to the ER with left lower quadrant abdominal pain.  No fevers or chills.  Nausea vomiting.  Does not want anything for pain.  Finished a course of antibiotics 9 days ago for diverticulitis, was taken Augmentin twice daily.  3 days ago pain returned in similar area.    On exam tender left lower quadrant    Agree with CAT scan to evaluate for worsening diverticulitis.    Reviewed CAT scan, diverticulitis near the previous area.  Potentially failure of outpatient antibiotics.  Patient would like to go home.  Will do levofloxacin and Flagyl at this time.    ED Course         Critical Care Time  Procedures

## 2025-01-30 NOTE — DISCHARGE INSTRUCTIONS
Dear Meche Lerma,     Fawad Medication Adjustments -   Please take flagyl 500 mg 3 times a day for 10 days.  Please take levofloxacin 750 once a day for 10 days.  Please utilize Tylenol and ibuprofen as needed for pain management  Testing Required  -   None  Other Instructions -   Please take all your medications regularly as directed.  If pain worsens or continues, please return to the ED.  Reach out to your gastroenterologist and let them know of this new diverticulitis.  Use miralax for constipation as needed after completing antibiotic course.      Sincerely,   Abe Rios MD

## 2025-02-01 ENCOUNTER — RESULTS FOLLOW-UP (OUTPATIENT)
Dept: EMERGENCY DEPT | Facility: HOSPITAL | Age: 60
End: 2025-02-01

## 2025-02-01 ENCOUNTER — HOSPITAL ENCOUNTER (INPATIENT)
Facility: HOSPITAL | Age: 60
LOS: 1 days | Discharge: HOME | DRG: 392 | End: 2025-02-02
Attending: EMERGENCY MEDICINE | Admitting: INTERNAL MEDICINE
Payer: COMMERCIAL

## 2025-02-01 DIAGNOSIS — K57.92 ACUTE DIVERTICULITIS: ICD-10-CM

## 2025-02-01 DIAGNOSIS — R05.9 COUGH, UNSPECIFIED TYPE: ICD-10-CM

## 2025-02-01 DIAGNOSIS — K57.92 DIVERTICULITIS: Primary | ICD-10-CM

## 2025-02-01 PROBLEM — R78.81 POSITIVE BLOOD CULTURE: Status: ACTIVE | Noted: 2025-02-01

## 2025-02-01 PROBLEM — K86.2 PANCREATIC CYST: Status: ACTIVE | Noted: 2025-02-01

## 2025-02-01 LAB
ALBUMIN SERPL BCG-MCNC: 3.9 G/DL (ref 3.5–5)
ALP SERPL-CCNC: 55 U/L (ref 34–104)
ALT SERPL W P-5'-P-CCNC: 9 U/L (ref 7–52)
ANION GAP SERPL CALCULATED.3IONS-SCNC: 8 MMOL/L (ref 4–13)
AST SERPL W P-5'-P-CCNC: 14 U/L (ref 13–39)
BASOPHILS # BLD AUTO: 0.04 THOUSANDS/ΜL (ref 0–0.1)
BASOPHILS NFR BLD AUTO: 1 % (ref 0–1)
BILIRUB SERPL-MCNC: 0.27 MG/DL (ref 0.2–1)
BUN SERPL-MCNC: 13 MG/DL (ref 5–25)
CALCIUM SERPL-MCNC: 9.2 MG/DL (ref 8.4–10.2)
CHLORIDE SERPL-SCNC: 106 MMOL/L (ref 96–108)
CO2 SERPL-SCNC: 26 MMOL/L (ref 21–32)
CREAT SERPL-MCNC: 0.93 MG/DL (ref 0.6–1.3)
EOSINOPHIL # BLD AUTO: 0.07 THOUSAND/ΜL (ref 0–0.61)
EOSINOPHIL NFR BLD AUTO: 1 % (ref 0–6)
ERYTHROCYTE [DISTWIDTH] IN BLOOD BY AUTOMATED COUNT: 12.4 % (ref 11.6–15.1)
GFR SERPL CREATININE-BSD FRML MDRD: 67 ML/MIN/1.73SQ M
GLUCOSE SERPL-MCNC: 95 MG/DL (ref 65–140)
HCT VFR BLD AUTO: 37.7 % (ref 34.8–46.1)
HGB BLD-MCNC: 12.4 G/DL (ref 11.5–15.4)
IMM GRANULOCYTES # BLD AUTO: 0.02 THOUSAND/UL (ref 0–0.2)
IMM GRANULOCYTES NFR BLD AUTO: 0 % (ref 0–2)
LIPASE SERPL-CCNC: 34 U/L (ref 11–82)
LYMPHOCYTES # BLD AUTO: 1.46 THOUSANDS/ΜL (ref 0.6–4.47)
LYMPHOCYTES NFR BLD AUTO: 23 % (ref 14–44)
MCH RBC QN AUTO: 30 PG (ref 26.8–34.3)
MCHC RBC AUTO-ENTMCNC: 32.9 G/DL (ref 31.4–37.4)
MCV RBC AUTO: 91 FL (ref 82–98)
MONOCYTES # BLD AUTO: 0.78 THOUSAND/ΜL (ref 0.17–1.22)
MONOCYTES NFR BLD AUTO: 12 % (ref 4–12)
NEUTROPHILS # BLD AUTO: 4.01 THOUSANDS/ΜL (ref 1.85–7.62)
NEUTS SEG NFR BLD AUTO: 63 % (ref 43–75)
NRBC BLD AUTO-RTO: 0 /100 WBCS
PLATELET # BLD AUTO: 244 THOUSANDS/UL (ref 149–390)
PMV BLD AUTO: 9.7 FL (ref 8.9–12.7)
POTASSIUM SERPL-SCNC: 4.1 MMOL/L (ref 3.5–5.3)
PROT SERPL-MCNC: 6.9 G/DL (ref 6.4–8.4)
RBC # BLD AUTO: 4.13 MILLION/UL (ref 3.81–5.12)
SODIUM SERPL-SCNC: 140 MMOL/L (ref 135–147)
WBC # BLD AUTO: 6.38 THOUSAND/UL (ref 4.31–10.16)

## 2025-02-01 PROCEDURE — 80053 COMPREHEN METABOLIC PANEL: CPT

## 2025-02-01 PROCEDURE — 99285 EMERGENCY DEPT VISIT HI MDM: CPT | Performed by: EMERGENCY MEDICINE

## 2025-02-01 PROCEDURE — 96374 THER/PROPH/DIAG INJ IV PUSH: CPT

## 2025-02-01 PROCEDURE — 99222 1ST HOSP IP/OBS MODERATE 55: CPT | Performed by: INTERNAL MEDICINE

## 2025-02-01 PROCEDURE — 87040 BLOOD CULTURE FOR BACTERIA: CPT | Performed by: INTERNAL MEDICINE

## 2025-02-01 PROCEDURE — 99285 EMERGENCY DEPT VISIT HI MDM: CPT

## 2025-02-01 PROCEDURE — 83690 ASSAY OF LIPASE: CPT

## 2025-02-01 PROCEDURE — 36415 COLL VENOUS BLD VENIPUNCTURE: CPT

## 2025-02-01 PROCEDURE — 85025 COMPLETE CBC W/AUTO DIFF WBC: CPT

## 2025-02-01 RX ORDER — ENOXAPARIN SODIUM 100 MG/ML
40 INJECTION SUBCUTANEOUS DAILY
Status: DISCONTINUED | OUTPATIENT
Start: 2025-02-02 | End: 2025-02-01

## 2025-02-01 RX ORDER — ONDANSETRON 4 MG/1
4 TABLET, FILM COATED ORAL EVERY 6 HOURS
Qty: 12 TABLET | Refills: 0 | Status: SHIPPED | OUTPATIENT
Start: 2025-02-01

## 2025-02-01 RX ORDER — SUCRALFATE 1 G/1
1 TABLET ORAL
Status: DISCONTINUED | OUTPATIENT
Start: 2025-02-01 | End: 2025-02-02 | Stop reason: HOSPADM

## 2025-02-01 RX ORDER — ONDANSETRON 2 MG/ML
4 INJECTION INTRAMUSCULAR; INTRAVENOUS EVERY 6 HOURS PRN
Status: DISCONTINUED | OUTPATIENT
Start: 2025-02-01 | End: 2025-02-02 | Stop reason: HOSPADM

## 2025-02-01 RX ORDER — FLUOXETINE 10 MG/1
10 CAPSULE ORAL DAILY
Status: DISCONTINUED | OUTPATIENT
Start: 2025-02-02 | End: 2025-02-02 | Stop reason: HOSPADM

## 2025-02-01 RX ORDER — ACETAMINOPHEN 325 MG/1
650 TABLET ORAL EVERY 6 HOURS PRN
Status: DISCONTINUED | OUTPATIENT
Start: 2025-02-01 | End: 2025-02-02 | Stop reason: HOSPADM

## 2025-02-01 RX ORDER — METRONIDAZOLE 500 MG/100ML
500 INJECTION, SOLUTION INTRAVENOUS EVERY 8 HOURS
Status: DISCONTINUED | OUTPATIENT
Start: 2025-02-01 | End: 2025-02-02 | Stop reason: HOSPADM

## 2025-02-01 RX ORDER — ONDANSETRON 2 MG/ML
4 INJECTION INTRAMUSCULAR; INTRAVENOUS ONCE
Status: COMPLETED | OUTPATIENT
Start: 2025-02-01 | End: 2025-02-01

## 2025-02-01 RX ADMIN — LIFITEGRAST 1 DROP: 50 SOLUTION/ DROPS OPHTHALMIC at 21:38

## 2025-02-01 RX ADMIN — CEFTRIAXONE SODIUM 1000 MG: 10 INJECTION, POWDER, FOR SOLUTION INTRAVENOUS at 20:05

## 2025-02-01 RX ADMIN — METRONIDAZOLE 500 MG: 500 INJECTION, SOLUTION INTRAVENOUS at 20:53

## 2025-02-01 RX ADMIN — ONDANSETRON 4 MG: 2 INJECTION INTRAMUSCULAR; INTRAVENOUS at 17:38

## 2025-02-01 NOTE — RESULT ENCOUNTER NOTE
I spoke to the patient in regards to her blood culture results that were positive for gram-positive cocci in chains.  This was determined to be a contaminant.  Patient is still having symptoms but no fever.  She is unable to keep down the Levaquin as she immediately vomits after taking it.  She is able to handle the Flagyl  I sent a prescription for Zofran over to the pharmacy for her to take 20 to 30 minutes before her Levaquin.  If she is unable to keep this down she knows that she needs to return to the emergency room for admission for IV antibiotics.

## 2025-02-01 NOTE — ED PROVIDER NOTES
Time reflects when diagnosis was documented in both MDM as applicable and the Disposition within this note       Time User Action Codes Description Comment    2/1/2025  6:57 PM Baldev Juarez Add [K57.92] Diverticulitis     2/2/2025  1:28 PM Celia Montano Add [K57.92] Acute diverticulitis     2/2/2025  1:28 PM Celia Montano Add [R05.9] Cough, unspecified type           ED Disposition       ED Disposition   Admit    Condition   Stable    Date/Time   Sat Feb 1, 2025  6:57 PM    Comment   Case was discussed with JORGE and the patient's admission status was agreed to be Admission Status: inpatient status to the service of Dr. Lizama .               Assessment & Plan       Medical Decision Making  Female patient who presented to the emergency department for abnormal lab finding as well as failure of outpatient antibiotics.  Patient had no acute exam findings.  Patient's labs showed no acute concerns with pending blood cultures.  While in the emergency department, patient was given Zofran.  Patient's case was discussed with internal medicine who accepted the patient for inpatient admission under the service of Dr. Lizama.  Patient admitted for failure of outpatient antibiotics for treatment of diverticulitis and is admitted for IV antibiotics.  Patient is agreeable to plan.    Amount and/or Complexity of Data Reviewed  Labs: ordered.    Risk  Prescription drug management.  Decision regarding hospitalization.             Medications   ondansetron (ZOFRAN) injection 4 mg (4 mg Intravenous Given 2/1/25 1738)       ED Risk Strat Scores                          SBIRT 22yo+      Flowsheet Row Most Recent Value   Initial Alcohol Screen: US AUDIT-C     1. How often do you have a drink containing alcohol? 0 Filed at: 02/01/2025 2216   2. How many drinks containing alcohol do you have on a typical day you are drinking?  0 Filed at: 02/01/2025 2216   3b. FEMALE Any Age, or MALE 65+: How often do you have 4 or more drinks on  one occassion? 0 Filed at: 02/01/2025 2216   Audit-C Score 0 Filed at: 02/01/2025 2216   TONE: How many times in the past year have you...    Used an illegal drug or used a prescription medication for non-medical reasons? Never Filed at: 02/01/2025 2216                            History of Present Illness       Chief Complaint   Patient presents with    Evaluation for Positive Culture     PT states was seen her recently for a diverticulitis flare up. Pt reports was called earlier in for evaluation of a + blood culture result. Pt also notes that she has been unable to tolerate PO abx at home.        Past Medical History:   Diagnosis Date    GERD (gastroesophageal reflux disease)       Past Surgical History:   Procedure Laterality Date    BUNIONECTOMY Right     COLONOSCOPY        Family History   Problem Relation Age of Onset    Hypertension Mother     Colon polyps Father     Heart disease Father     Colon cancer Paternal Grandmother       Social History     Tobacco Use    Smoking status: Never    Smokeless tobacco: Never   Substance Use Topics    Alcohol use: Yes     Alcohol/week: 1.0 standard drink of alcohol     Types: 1 Glasses of wine per week     Comment: socially    Drug use: Never      E-Cigarette/Vaping      E-Cigarette/Vaping Substances      I have reviewed and agree with the history as documented.     59-year-old female with significant PMH including diverticulitis who presents to the emergency department for abnormal lab finding as well as vomiting.  Patient states that she was recently sent home from the emergency department on 1/30/2025 after another flare of diverticulitis.  She was sent home on Levaquin but states that for the past 2 days she has been unable to tolerate it because it is causing her to be nauseous and have vomiting.  Patient was called stating that her blood culture showed likely contamination and was sent Zofran.  Patient states she was unable to tolerate Levaquin still and came to  the emergency department.  She states that her diverticular pain has improved just that she cannot take her antibiotics at home.  No other acute concerns at this time. Denies chest pain, SOB, cough, abdominal pain, diarrhea, fever, chills, dizziness, lightheadedness, HA, dysuria, hematuria, hematochezia, or melena.             Review of Systems   Constitutional:  Negative for appetite change, chills, diaphoresis, fatigue and fever.   HENT:  Negative for congestion, ear pain, postnasal drip, rhinorrhea, sore throat and trouble swallowing.    Eyes:  Negative for pain and visual disturbance.   Respiratory:  Negative for cough and shortness of breath.    Cardiovascular:  Negative for chest pain and palpitations.   Gastrointestinal:  Positive for nausea and vomiting. Negative for abdominal pain, constipation and diarrhea.   Genitourinary:  Negative for decreased urine volume, dysuria and hematuria.   Musculoskeletal:  Negative for arthralgias and back pain.   Skin:  Negative for color change and rash.   Neurological:  Negative for dizziness, seizures, syncope, weakness, light-headedness and headaches.   All other systems reviewed and are negative.          Objective       ED Triage Vitals   Temperature Pulse Blood Pressure Respirations SpO2 Patient Position - Orthostatic VS   02/01/25 1701 02/01/25 1701 02/01/25 1701 02/01/25 1701 02/01/25 1701 02/01/25 1701   99 °F (37.2 °C) 89 154/70 18 98 % Lying      Temp Source Heart Rate Source BP Location FiO2 (%) Pain Score    02/01/25 1701 02/01/25 1701 02/01/25 1701 -- 02/02/25 0734    Oral Monitor Right arm  No Pain      Vitals      Date and Time Temp Pulse SpO2 Resp BP Pain Score FACES Pain Rating User   02/02/25 0734 97.9 °F (36.6 °C) 59 98 % 18 116/69 No Pain --    02/02/25 0400 -- 72 97 % 16 122/72 -- --    02/02/25 0000 -- 74 98 % 18 130/78 -- --    02/01/25 2052 -- 77 98 % 18 128/76 -- -- LA   02/01/25 1701 99 °F (37.2 °C) 89 98 % 18 154/70 -- -- AW             Physical Exam  Vitals and nursing note reviewed.   Constitutional:       General: She is not in acute distress.     Appearance: Normal appearance. She is normal weight.   HENT:      Head: Normocephalic and atraumatic.      Right Ear: External ear normal.      Left Ear: External ear normal.      Nose: Nose normal.      Mouth/Throat:      Pharynx: Oropharynx is clear.   Eyes:      Conjunctiva/sclera: Conjunctivae normal.   Cardiovascular:      Rate and Rhythm: Normal rate and regular rhythm.      Pulses: Normal pulses.      Heart sounds: Normal heart sounds.      Comments: RRR with +S1 and S2, no murmurs appreciated on exam. Peripheral pulses intact.    Pulmonary:      Effort: Pulmonary effort is normal. No respiratory distress.      Breath sounds: Normal breath sounds. No wheezing, rhonchi or rales.      Comments: CTABL with no abnormal lung sounds such as wheezes or rales appreciated on exam.     Abdominal:      General: Abdomen is flat. Bowel sounds are normal. There is no distension.      Palpations: Abdomen is soft.      Tenderness: There is no abdominal tenderness.      Comments: Soft, non tender, normo-active bowel sounds. Without rigidity, guarding, or distension.     Musculoskeletal:         General: Normal range of motion.      Cervical back: Normal range of motion.   Skin:     General: Skin is warm and dry.   Neurological:      General: No focal deficit present.      Mental Status: She is alert and oriented to person, place, and time. Mental status is at baseline.      Comments: CN grossly intact on visualization. No focal neurologic deficits noted on exam.  5/5 strength in all extremities. Neurovascularly intact with normal sensation and motor function.             Results Reviewed       Procedure Component Value Units Date/Time    Basic metabolic panel [[id number]] Collected: 02/02/25 1868    Lab Status: Final result Specimen: Blood from Arm, Left Updated: 02/02/25 1369     Sodium 138 mmol/L       Potassium 3.9 mmol/L      Chloride 108 mmol/L      CO2 21 mmol/L      ANION GAP 9 mmol/L      BUN 11 mg/dL      Creatinine 0.89 mg/dL      Glucose 100 mg/dL      Calcium 8.8 mg/dL      eGFR 71 ml/min/1.73sq m     Narrative:      National Kidney Disease Foundation guidelines for Chronic Kidney Disease (CKD):     Stage 1 with normal or high GFR (GFR > 90 mL/min/1.73 square meters)    Stage 2 Mild CKD (GFR = 60-89 mL/min/1.73 square meters)    Stage 3A Moderate CKD (GFR = 45-59 mL/min/1.73 square meters)    Stage 3B Moderate CKD (GFR = 30-44 mL/min/1.73 square meters)    Stage 4 Severe CKD (GFR = 15-29 mL/min/1.73 square meters)    Stage 5 End Stage CKD (GFR <15 mL/min/1.73 square meters)  Note: GFR calculation is accurate only with a steady state creatinine    CBC and differential [[id number]]  (Abnormal) Collected: 02/02/25 0419    Lab Status: Final result Specimen: Blood from Arm, Left Updated: 02/02/25 0448     WBC 5.61 Thousand/uL      RBC 3.92 Million/uL      Hemoglobin 11.8 g/dL      Hematocrit 36.3 %      MCV 93 fL      MCH 30.1 pg      MCHC 32.5 g/dL      RDW 12.5 %      MPV 9.6 fL      Platelets 231 Thousands/uL      nRBC 0 /100 WBCs      Segmented % 44 %      Immature Grans % 0 %      Lymphocytes % 39 %      Monocytes % 15 %      Eosinophils Relative 1 %      Basophils Relative 1 %      Absolute Neutrophils 2.42 Thousands/µL      Absolute Immature Grans 0.02 Thousand/uL      Absolute Lymphocytes 2.21 Thousands/µL      Absolute Monocytes 0.86 Thousand/µL      Eosinophils Absolute 0.07 Thousand/µL      Basophils Absolute 0.03 Thousands/µL     Blood culture [[id number]] Collected: 02/01/25 1848    Lab Status: Preliminary result Specimen: Blood from Arm, Left Updated: 02/02/25 0101     Blood Culture Received in Microbiology Lab. Culture in Progress.    Blood culture [[id number]] Collected: 02/01/25 2001    Lab Status: Preliminary result Specimen: Blood from Arm, Right Updated: 02/02/25 0101     Blood Culture  Received in Microbiology Lab. Culture in Progress.    Comprehensive metabolic panel [[id number]] Collected: 02/01/25 1738    Lab Status: Final result Specimen: Blood from Arm, Left Updated: 02/01/25 1835     Sodium 140 mmol/L      Potassium 4.1 mmol/L      Chloride 106 mmol/L      CO2 26 mmol/L      ANION GAP 8 mmol/L      BUN 13 mg/dL      Creatinine 0.93 mg/dL      Glucose 95 mg/dL      Calcium 9.2 mg/dL      AST 14 U/L      ALT 9 U/L      Alkaline Phosphatase 55 U/L      Total Protein 6.9 g/dL      Albumin 3.9 g/dL      Total Bilirubin 0.27 mg/dL      eGFR 67 ml/min/1.73sq m     Narrative:      National Kidney Disease Foundation guidelines for Chronic Kidney Disease (CKD):     Stage 1 with normal or high GFR (GFR > 90 mL/min/1.73 square meters)    Stage 2 Mild CKD (GFR = 60-89 mL/min/1.73 square meters)    Stage 3A Moderate CKD (GFR = 45-59 mL/min/1.73 square meters)    Stage 3B Moderate CKD (GFR = 30-44 mL/min/1.73 square meters)    Stage 4 Severe CKD (GFR = 15-29 mL/min/1.73 square meters)    Stage 5 End Stage CKD (GFR <15 mL/min/1.73 square meters)  Note: GFR calculation is accurate only with a steady state creatinine    Lipase [[id number]]  (Normal) Collected: 02/01/25 1738    Lab Status: Final result Specimen: Blood from Arm, Left Updated: 02/01/25 1835     Lipase 34 u/L     CBC and differential [[id number]] Collected: 02/01/25 1738    Lab Status: Final result Specimen: Blood from Arm, Left Updated: 02/01/25 1750     WBC 6.38 Thousand/uL      RBC 4.13 Million/uL      Hemoglobin 12.4 g/dL      Hematocrit 37.7 %      MCV 91 fL      MCH 30.0 pg      MCHC 32.9 g/dL      RDW 12.4 %      MPV 9.7 fL      Platelets 244 Thousands/uL      nRBC 0 /100 WBCs      Segmented % 63 %      Immature Grans % 0 %      Lymphocytes % 23 %      Monocytes % 12 %      Eosinophils Relative 1 %      Basophils Relative 1 %      Absolute Neutrophils 4.01 Thousands/µL      Absolute Immature Grans 0.02 Thousand/uL      Absolute Lymphocytes  1.46 Thousands/µL      Absolute Monocytes 0.78 Thousand/µL      Eosinophils Absolute 0.07 Thousand/µL      Basophils Absolute 0.04 Thousands/µL             No orders to display       Procedures    ED Medication and Procedure Management   Prior to Admission Medications   Prescriptions Last Dose Informant Patient Reported? Taking?   FLUoxetine (PROzac) 10 mg capsule  Self Yes No   Sig: Take 10 mg by mouth daily   Magnesium 125 MG CAPS  Self Yes No   Omega-3 Fatty Acids (Omega-3 Fish Oil) 1000 MG CAPS  Self Yes No   Sig: Fish Oil   Vonoprazan Fumarate 20 MG TABS  Self No No   Sig: Take 20 mg by mouth in the morning   Xiidra 5 % op solution  Self Yes No   cyclobenzaprine (FLEXERIL) 5 mg tablet  Self Yes No   Sig: Take 5 mg by mouth 3 (three) times a day as needed for muscle spasms   fluconazole (DIFLUCAN) 100 mg tablet   No No   Sig: Take 1 tablet (100 mg total) by mouth daily for 5 days   levofloxacin (LEVAQUIN) 750 mg tablet   No No   Sig: Take 1 tablet (750 mg total) by mouth every 24 hours for 10 days   metroNIDAZOLE (FLAGYL) 500 mg tablet   No No   Sig: Take 1 tablet (500 mg total) by mouth every 8 (eight) hours for 10 days   ondansetron (ZOFRAN) 4 mg tablet   No No   Sig: Take 1 tablet (4 mg total) by mouth every 6 (six) hours   polyethylene glycol (MiraLax) 17 GM/SCOOP powder  Self Yes No   sucralfate (CARAFATE) 1 g tablet  Self Yes No   sucralfate (CARAFATE) 1 g tablet   No No   Sig: Take 1 tablet (1 g total) by mouth 4 (four) times a day      Facility-Administered Medications: None     Discharge Medication List as of 2/2/2025  1:40 PM        START taking these medications    Details   benzonatate (TESSALON PERLES) 100 mg capsule Take 1 capsule (100 mg total) by mouth 3 (three) times a day as needed for cough, Starting Sun 2/2/2025, Normal      cefdinir (OMNICEF) 300 mg capsule Take 1 capsule (300 mg total) by mouth every 12 (twelve) hours for 7 days, Starting Sun 2/2/2025, Until Sun 2/9/2025, Normal            CONTINUE these medications which have NOT CHANGED    Details   cyclobenzaprine (FLEXERIL) 5 mg tablet Take 5 mg by mouth 3 (three) times a day as needed for muscle spasms, Historical Med      fluconazole (DIFLUCAN) 100 mg tablet Take 1 tablet (100 mg total) by mouth daily for 5 days, Starting Wed 1/29/2025, Until Mon 2/3/2025, Normal      FLUoxetine (PROzac) 10 mg capsule Take 10 mg by mouth daily, Starting Mon 5/22/2023, Historical Med      Magnesium 125 MG CAPS Historical Med      metroNIDAZOLE (FLAGYL) 500 mg tablet Take 1 tablet (500 mg total) by mouth every 8 (eight) hours for 10 days, Starting Thu 1/30/2025, Until Sun 2/9/2025, Normal      Omega-3 Fatty Acids (Omega-3 Fish Oil) 1000 MG CAPS Fish Oil, Historical Med      ondansetron (ZOFRAN) 4 mg tablet Take 1 tablet (4 mg total) by mouth every 6 (six) hours, Starting Sat 2/1/2025, Normal      polyethylene glycol (MiraLax) 17 GM/SCOOP powder Historical Med      !! sucralfate (CARAFATE) 1 g tablet Historical Med      !! sucralfate (CARAFATE) 1 g tablet Take 1 tablet (1 g total) by mouth 4 (four) times a day, Starting Wed 1/15/2025, Normal      Vonoprazan Fumarate 20 MG TABS Take 20 mg by mouth in the morning, Starting Thu 12/19/2024, Normal      Xiidra 5 % op solution Historical Med       !! - Potential duplicate medications found. Please discuss with provider.        STOP taking these medications       levofloxacin (LEVAQUIN) 750 mg tablet Comments:   Reason for Stopping:             No discharge procedures on file.  ED SEPSIS DOCUMENTATION   Time reflects when diagnosis was documented in both MDM as applicable and the Disposition within this note       Time User Action Codes Description Comment    2/1/2025  6:57 PM Baldev Juarez [K57.92] Diverticulitis     2/2/2025  1:28 PM Celia Montano [K57.92] Acute diverticulitis     2/2/2025  1:28 PM Celia Montano [R05.9] Cough, unspecified type                  Baldev Juarez MD  02/02/25 6043

## 2025-02-02 VITALS
SYSTOLIC BLOOD PRESSURE: 116 MMHG | RESPIRATION RATE: 18 BRPM | DIASTOLIC BLOOD PRESSURE: 69 MMHG | BODY MASS INDEX: 24.22 KG/M2 | OXYGEN SATURATION: 98 % | WEIGHT: 154.32 LBS | HEIGHT: 67 IN | TEMPERATURE: 97.9 F | HEART RATE: 59 BPM

## 2025-02-02 PROBLEM — R05.9 COUGH: Status: ACTIVE | Noted: 2025-02-02

## 2025-02-02 LAB
ANION GAP SERPL CALCULATED.3IONS-SCNC: 9 MMOL/L (ref 4–13)
BACTERIA BLD CULT: ABNORMAL
BACTERIA BLD CULT: NORMAL
BASOPHILS # BLD AUTO: 0.03 THOUSANDS/ΜL (ref 0–0.1)
BASOPHILS NFR BLD AUTO: 1 % (ref 0–1)
BUN SERPL-MCNC: 11 MG/DL (ref 5–25)
CALCIUM SERPL-MCNC: 8.8 MG/DL (ref 8.4–10.2)
CHLORIDE SERPL-SCNC: 108 MMOL/L (ref 96–108)
CO2 SERPL-SCNC: 21 MMOL/L (ref 21–32)
CREAT SERPL-MCNC: 0.89 MG/DL (ref 0.6–1.3)
EOSINOPHIL # BLD AUTO: 0.07 THOUSAND/ΜL (ref 0–0.61)
EOSINOPHIL NFR BLD AUTO: 1 % (ref 0–6)
ERYTHROCYTE [DISTWIDTH] IN BLOOD BY AUTOMATED COUNT: 12.5 % (ref 11.6–15.1)
GFR SERPL CREATININE-BSD FRML MDRD: 71 ML/MIN/1.73SQ M
GLUCOSE SERPL-MCNC: 100 MG/DL (ref 65–140)
GRAM STN SPEC: ABNORMAL
HCT VFR BLD AUTO: 36.3 % (ref 34.8–46.1)
HGB BLD-MCNC: 11.8 G/DL (ref 11.5–15.4)
IMM GRANULOCYTES # BLD AUTO: 0.02 THOUSAND/UL (ref 0–0.2)
IMM GRANULOCYTES NFR BLD AUTO: 0 % (ref 0–2)
LYMPHOCYTES # BLD AUTO: 2.21 THOUSANDS/ΜL (ref 0.6–4.47)
LYMPHOCYTES NFR BLD AUTO: 39 % (ref 14–44)
MCH RBC QN AUTO: 30.1 PG (ref 26.8–34.3)
MCHC RBC AUTO-ENTMCNC: 32.5 G/DL (ref 31.4–37.4)
MCV RBC AUTO: 93 FL (ref 82–98)
MONOCYTES # BLD AUTO: 0.86 THOUSAND/ΜL (ref 0.17–1.22)
MONOCYTES NFR BLD AUTO: 15 % (ref 4–12)
NEUTROPHILS # BLD AUTO: 2.42 THOUSANDS/ΜL (ref 1.85–7.62)
NEUTS SEG NFR BLD AUTO: 44 % (ref 43–75)
NRBC BLD AUTO-RTO: 0 /100 WBCS
PLATELET # BLD AUTO: 231 THOUSANDS/UL (ref 149–390)
PMV BLD AUTO: 9.6 FL (ref 8.9–12.7)
POTASSIUM SERPL-SCNC: 3.9 MMOL/L (ref 3.5–5.3)
RBC # BLD AUTO: 3.92 MILLION/UL (ref 3.81–5.12)
SODIUM SERPL-SCNC: 138 MMOL/L (ref 135–147)
STREPTOCOCCUS DNA BLD POS NAA+NON-PROBE: DETECTED
WBC # BLD AUTO: 5.61 THOUSAND/UL (ref 4.31–10.16)

## 2025-02-02 PROCEDURE — 80048 BASIC METABOLIC PNL TOTAL CA: CPT

## 2025-02-02 PROCEDURE — 85025 COMPLETE CBC W/AUTO DIFF WBC: CPT

## 2025-02-02 PROCEDURE — 99239 HOSP IP/OBS DSCHRG MGMT >30: CPT | Performed by: INTERNAL MEDICINE

## 2025-02-02 PROCEDURE — 36415 COLL VENOUS BLD VENIPUNCTURE: CPT

## 2025-02-02 RX ORDER — BENZONATATE 100 MG/1
100 CAPSULE ORAL 3 TIMES DAILY PRN
Status: DISCONTINUED | OUTPATIENT
Start: 2025-02-02 | End: 2025-02-02 | Stop reason: HOSPADM

## 2025-02-02 RX ORDER — BENZONATATE 100 MG/1
100 CAPSULE ORAL 3 TIMES DAILY PRN
Qty: 20 CAPSULE | Refills: 0 | Status: SHIPPED | OUTPATIENT
Start: 2025-02-02

## 2025-02-02 RX ORDER — CEFDINIR 300 MG/1
300 CAPSULE ORAL EVERY 12 HOURS SCHEDULED
Qty: 14 CAPSULE | Refills: 0 | Status: SHIPPED | OUTPATIENT
Start: 2025-02-02 | End: 2025-02-09

## 2025-02-02 RX ORDER — BENZONATATE 100 MG/1
100 CAPSULE ORAL 3 TIMES DAILY PRN
Qty: 20 CAPSULE | Refills: 0 | Status: CANCELLED | OUTPATIENT
Start: 2025-02-02

## 2025-02-02 RX ADMIN — LIFITEGRAST 1 DROP: 50 SOLUTION/ DROPS OPHTHALMIC at 09:20

## 2025-02-02 RX ADMIN — METRONIDAZOLE 500 MG: 500 INJECTION, SOLUTION INTRAVENOUS at 04:19

## 2025-02-02 RX ADMIN — BENZONATATE 100 MG: 100 CAPSULE ORAL at 12:08

## 2025-02-02 RX ADMIN — SUCRALFATE 1 G: 1 TABLET ORAL at 10:57

## 2025-02-02 RX ADMIN — BENZONATATE 100 MG: 100 CAPSULE ORAL at 04:22

## 2025-02-02 RX ADMIN — METRONIDAZOLE 500 MG: 500 INJECTION, SOLUTION INTRAVENOUS at 12:08

## 2025-02-02 NOTE — H&P
"H&P - Hospitalist   Name: Meche Lerma 59 y.o. female I MRN: [id number]  Unit/Bed#: ED-42 I Date of Admission: 2/1/2025   Date of Service: 2/1/2025 I Hospital Day: 0     Assessment & Plan  Acute diverticulitis  Presents with LLQ x3 days, CT imaging shows acute diverticulitis in the distal descending colon of the left lower abdomen proximal to prior area of diverticulitis.  No abdominopelvic abscess present  Second episode of diverticulitis in 3 weeks, first case resolved with 7 days of augmentin  Discharged from ED on 1/30 on oral flagyl and levoquin - unable to tolerate levaquin due to GI upset and dry heaving  Patient tolerating oral water and regular diet  Hemodynamically stable  No white count or fever   LLQ of abdomen mildly tender to palpation    Plan:  Will admit and start IV flagyl and IV ceftriaxone  Trend CBC and fever curve  PRN zofran  Lite diet ordered, advance as tolerated  Tylenol PRN for pain  Patient scheduled for colonoscopy as outpatient with GI 6-8 weeks after resolution of diverticulitis   Gastroesophageal reflux disease without esophagitis  Continue PTA PPI  Pancreatic cyst  Unchanged 0.8 cm pancreatic cyst. Per review of the medical record, a \"0.7 cm low-attenuation lesion [was present] in the tail the pancreas\" on the July 20, 2016 CT performed at [facility]. Comparison to remote prior imaging is advised to   assess the need for continued follow-up.    Outpatient follow up recommended  Positive blood culture  1 out of 2 Blood cultures from 1/30 positive for streptococcus species  Likely contaminant but will continue to trend cultures and repeat on this admission  Patient receiving IV rocephin for diverticulitis       VTE Pharmacologic Prophylaxis:   Low Risk (Score 0-2) - Encourage Ambulation.  Code Status: Level 1 - Full Code   Discussion with family: Patient declined call to .     Anticipated Length of Stay: Patient will be admitted on an observation basis with " an anticipated length of stay of less than 2 midnights secondary to Acute Diverticulitis.    History of Present Illness   Chief Complaint: abdominal pain    Meche Lerma is a 59 y.o. female with a PMH of GERD, anxiety and colonic polyps who presents with 3-4 days of abdominal pain in the LLQ.  Patient was just discharged on 1/13/25 for episode of acute diverticulitis where she completed a 7 day course of augmentin and followed up with GI as outpatient.  Colonoscopy was supposed to be performed 6-8 weeks after resolution of diverticulitis.  Patient had been feeling well until about 1/29 where she developed sharp LLQ pain again.  She started a liquid diet at home and pain still persisted.  Patient then came to the ED on 1/30 where CT imaging showed new foci of diverticulitis just proximal to prior site.  She was recommended to be admitted but wanted to go home.  Patient was discharged on course of oral flagyl and levafloxicin.  She reports she was able to tolerate the flagyl but every time she takes the levaquin she develops nausea and dry heaving about one hour later.  Patient denies fever or chills.  She still has some abdominal discomfort but it has greatly improved since the onset of her symptoms.  Patient says she has been able to eat a normal diet and tolerate oral hydration.      In the ED patient was hemodynamically stable.  Initial lab studies showed no leukocytosis, electrolyte abnormalities or renal dysfunction.  Lipase was wnl.  Blood cultures from ED admission on 1/30 were positive 1/2 for streptococcus species.      Review of Systems   Constitutional:  Negative for chills and fever.   HENT:  Negative for ear pain and sore throat.    Eyes:  Negative for pain and visual disturbance.   Respiratory:  Negative for cough and shortness of breath.    Cardiovascular:  Negative for chest pain and palpitations.   Gastrointestinal:  Positive for abdominal pain. Negative for vomiting.   Genitourinary:  Negative for  dysuria and hematuria.   Musculoskeletal:  Negative for arthralgias and back pain.   Skin:  Negative for color change and rash.   Neurological:  Negative for seizures and syncope.   All other systems reviewed and are negative.      Historical Information   Past Medical History:   Diagnosis Date    GERD (gastroesophageal reflux disease)      Past Surgical History:   Procedure Laterality Date    BUNIONECTOMY Right     COLONOSCOPY       Social History     Tobacco Use    Smoking status: Never    Smokeless tobacco: Never   Substance and Sexual Activity    Alcohol use: Yes     Alcohol/week: 1.0 standard drink of alcohol     Types: 1 Glasses of wine per week     Comment: socially    Drug use: Never    Sexual activity: Not on file     E-Cigarette/Vaping     E-Cigarette/Vaping Substances       Social History:  Marital Status: /Civil Union   Occupation:   Patient Pre-hospital Living Situation: Home  Patient Pre-hospital Level of Mobility: walks  Patient Pre-hospital Diet Restrictions:     Meds/Allergies   I have reviewed home medications with patient personally.  Prior to Admission medications    Medication Sig Start Date End Date Taking? Authorizing Provider   cyclobenzaprine (FLEXERIL) 5 mg tablet Take 5 mg by mouth 3 (three) times a day as needed for muscle spasms    Historical Provider, MD   fluconazole (DIFLUCAN) 100 mg tablet Take 1 tablet (100 mg total) by mouth daily for 5 days 1/29/25 2/3/25  Kelsey Jones PA-C   FLUoxetine (PROzac) 10 mg capsule Take 10 mg by mouth daily 5/22/23   Historical Provider, MD   levofloxacin (LEVAQUIN) 750 mg tablet Take 1 tablet (750 mg total) by mouth every 24 hours for 10 days 1/30/25 2/9/25  Abe Rios MD   Magnesium 125 MG CAPS  8/1/20   Historical Provider, MD   metroNIDAZOLE (FLAGYL) 500 mg tablet Take 1 tablet (500 mg total) by mouth every 8 (eight) hours for 10 days 1/30/25 2/9/25  Abe Rios MD   Omega-3 Fatty Acids (Omega-3 Fish Oil) 1000 MG CAPS Fish Oil     Historical Provider, MD   ondansetron (ZOFRAN) 4 mg tablet Take 1 tablet (4 mg total) by mouth every 6 (six) hours 2/1/25   Julie Lynn Gutzweiler, PA-C   polyethylene glycol (MiraLax) 17 GM/SCOOP powder  1/1/1995   Historical Provider, MD   sucralfate (CARAFATE) 1 g tablet  1/10/25   Historical Provider, MD   sucralfate (CARAFATE) 1 g tablet Take 1 tablet (1 g total) by mouth 4 (four) times a day 1/15/25   Kelsey Jones PA-C   Vonoprazan Fumarate 20 MG TABS Take 20 mg by mouth in the morning 12/19/24   Tobias Correa DO   Xiidra 5 % op solution  8/27/24   Historical Provider, MD     No Known Allergies    Objective :  Temp:  [99 °F (37.2 °C)] 99 °F (37.2 °C)  HR:  [89] 89  BP: (154)/(70) 154/70  Resp:  [18] 18  SpO2:  [98 %] 98 %  O2 Device: None (Room air)    Physical Exam  Constitutional:       General: She is not in acute distress.     Appearance: Normal appearance. She is normal weight. She is not ill-appearing or toxic-appearing.   HENT:      Head: Normocephalic and atraumatic.   Eyes:      Extraocular Movements: Extraocular movements intact.      Pupils: Pupils are equal, round, and reactive to light.   Cardiovascular:      Rate and Rhythm: Normal rate and regular rhythm.      Pulses: Normal pulses.      Heart sounds: Normal heart sounds.   Pulmonary:      Effort: Pulmonary effort is normal.      Breath sounds: Normal breath sounds.   Abdominal:      General: Abdomen is flat. Bowel sounds are normal. There is no distension.      Palpations: Abdomen is soft.      Comments: Mild tenderness to palpation in the LLQ   Musculoskeletal:         General: Normal range of motion.      Cervical back: Normal range of motion.      Right lower leg: No edema.      Left lower leg: No edema.   Skin:     General: Skin is warm.      Coloration: Skin is not jaundiced or pale.      Findings: No rash.   Neurological:      General: No focal deficit present.      Mental Status: She is alert and oriented to person, place, and  "time. Mental status is at baseline.      Cranial Nerves: No cranial nerve deficit.      Sensory: No sensory deficit.      Motor: No weakness.   Psychiatric:         Mood and Affect: Mood normal.         Behavior: Behavior normal.          Lines/Drains:            Lab Results: I have reviewed the following results:  Results from last 7 days   Lab Units 02/01/25  1738   WBC Thousand/uL 6.38   HEMOGLOBIN g/dL 12.4   HEMATOCRIT % 37.7   PLATELETS Thousands/uL 244   SEGS PCT % 63   LYMPHO PCT % 23   MONO PCT % 12   EOS PCT % 1     Results from last 7 days   Lab Units 02/01/25  1738   SODIUM mmol/L 140   POTASSIUM mmol/L 4.1   CHLORIDE mmol/L 106   CO2 mmol/L 26   BUN mg/dL 13   CREATININE mg/dL 0.93   ANION GAP mmol/L 8   CALCIUM mg/dL 9.2   ALBUMIN g/dL 3.9   TOTAL BILIRUBIN mg/dL 0.27   ALK PHOS U/L 55   ALT U/L 9   AST U/L 14   GLUCOSE RANDOM mg/dL 95             No results found for: \"HGBA1C\"  Results from last 7 days   Lab Units 01/30/25  1146   LACTIC ACID mmol/L 1.0             Administrative Statements       ** Please Note: This note has been constructed using a voice recognition system. **    "

## 2025-02-02 NOTE — ASSESSMENT & PLAN NOTE
"Unchanged 0.8 cm pancreatic cyst. Per review of the medical record, a \"0.7 cm low-attenuation lesion [was present] in the tail the pancreas\" on the July 20, 2016 CT performed at [facility]. Comparison to remote prior imaging is advised to   assess the need for continued follow-up.    Outpatient follow up recommended  "

## 2025-02-02 NOTE — ASSESSMENT & PLAN NOTE
1 out of 2 Blood cultures from 1/30 positive for streptococcus species  Likely contaminant but will continue to trend cultures and repeat on this admission  Patient receiving IV rocephin for diverticulitis

## 2025-02-02 NOTE — DISCHARGE SUMMARY
"Discharge Summary - Hospitalist   Name: Meche Lerma 59 y.o. female I MRN: [id number]  Unit/Bed#: ED-42 I Date of Admission: 2/1/2025   Date of Service: 2/2/2025 I Hospital Day: 1     Assessment & Plan  Acute diverticulitis  Presents with LLQ x3 days, CT imaging shows acute diverticulitis in the distal descending colon of the left lower abdomen proximal to prior area of diverticulitis.  No abdominopelvic abscess present  Second episode of diverticulitis in 3 weeks, first case resolved with 7 days of augmentin  Discharged from ED on 1/30 on oral flagyl and levoquin - unable to tolerate levaquin due to GI upset and dry heaving  Patient tolerating oral water and regular diet  Hemodynamically stable  No white count or fever   LLQ of abdomen mildly tender to palpation    Plan:  Discharge home on cefdinir/Flagyl to complete 10-day course of antibiotic  Follow-up on repeat blood cultures and notify patient's unresolved.  Continue as needed Zofran for nausea  Continue as needed Tylenol for pain  Continued lites diet as tolerated.  Patient has been advised to follow-up with primary care provider/colonoscopy/GI appointment as scheduled.  Patient scheduled for colonoscopy as outpatient with GI 6-8 weeks after resolution of diverticulitis   Gastroesophageal reflux disease without esophagitis  Continue PTA PPI  Pancreatic cyst  Unchanged 0.8 cm pancreatic cyst. Per review of the medical record, a \"0.7 cm low-attenuation lesion [was present] in the tail the pancreas\" on the July 20, 2016 CT performed at [facility]. Comparison to remote prior imaging is advised to   assess the need for continued follow-up.    Outpatient follow up recommended  Positive blood culture  1 out of 2 Blood cultures from 1/30 positive for streptococcus species  Likely contaminant   Continue follow-up on blood cultures.     Medical Problems       Resolved Problems  Date Reviewed: 2/2/2025   None       Discharging Physician / Practitioner: " Celia Montano MD  PCP: Loretta Abrams MD  Admission Date:   Admission Orders (From admission, onward)       Ordered        02/01/25 1857  INPATIENT ADMISSION  Once                          Discharge Date: 02/02/25    Consultations During Hospital Stay:  None    Procedures Performed:   None    Significant Findings / Test Results:   None    Incidental Findings:   None      Test Results Pending at Discharge (will require follow up):   None     Outpatient Tests Requested:  None    Complications:  None     Reason for Admission: Acute diverticulitis    Hospital Course:   Meche Lerma is a 59 y.o. female patient with PMHx of GERD, anxiety, colonic polyps who originally presented to the hospital on 2/1/2025 due to left lower quadrant abdominal pain.  She had been initially discharged on 1/13/2025 for episode of acute diverticulitis completed a 7-day course of Augmentin and followed up with GI as outpatient. Colonoscopy was scheduled however patient represented on 1/29 with acute LLQ pain again, CT imaging showing a new foci for diverticulitis just proximal to the prior site. She was prescribed Levaquin and oral Flagyl discharged home, but patient has been unable to tolerate Levaquin with dry heaving represented to the ED.  On this presentation white count remains normal, patient remains hemodynamically stable, no electrolyte or renal dysfunction blood cultures from prior admission on 01/30 was positive for 1/2 Streptococcus species, repeat blood cultures have been ordered we will follow-up and notify patient accordingly.  Stable for discharge today denies any nausea, vomiting, diarrhea, bloody stools, or abdominal pains, she will be discharged on a course of cefdinir/Flagyl to complete a 10-day course of antibiotic.  She also reports some mild cough but CT scan abdomen pelvis which showed part of the lungs was negative for any foci of infection she has been discharged on Tessalon Perles to take as needed basis.  She  "has been advised to follow-up with her gastroenterologist, and colonoscopy as scheduled on 03/03.    Please see above list of diagnoses and related plan for additional information.     Condition at Discharge: good    Discharge Day Visit / Exam:   Subjective: Patient seen and examined today, no acute events overnight.  She denies nausea, vomiting, diarrhea.  Last bowel movement was yesterday denies bloody stool.  She is eating and tolerating a diet well no dry heaving no abdominal pains.    Vitals: Blood Pressure: 116/69 (02/02/25 0734)  Pulse: 59 (02/02/25 0734)  Temperature: 97.9 °F (36.6 °C) (02/02/25 0734)  Temp Source: Oral (02/02/25 0734)  Respirations: 18 (02/02/25 0734)  Height: 5' 7\" (170.2 cm) (02/02/25 0734)  Weight - Scale: 70 kg (154 lb 5.2 oz) (02/02/25 0734)  SpO2: 98 % (02/02/25 0734)  Physical Exam  Vitals and nursing note reviewed.   Constitutional:       General: She is not in acute distress.     Appearance: She is well-developed.   HENT:      Head: Normocephalic and atraumatic.   Eyes:      Conjunctiva/sclera: Conjunctivae normal.   Cardiovascular:      Rate and Rhythm: Normal rate and regular rhythm.      Heart sounds: No murmur heard.  Pulmonary:      Effort: Pulmonary effort is normal. No respiratory distress.      Breath sounds: Normal breath sounds.   Abdominal:      Palpations: Abdomen is soft.      Tenderness: There is abdominal tenderness (Mildly tender in the LLQ).   Musculoskeletal:         General: No swelling.      Cervical back: Neck supple.   Skin:     General: Skin is warm and dry.      Capillary Refill: Capillary refill takes less than 2 seconds.   Neurological:      Mental Status: She is alert.   Psychiatric:         Mood and Affect: Mood normal.          Discussion with Family: Patient declined call to .     Discharge instructions/Information to patient and family:   See after visit summary for information provided to patient and family.      Provisions for " Follow-Up Care:  See after visit summary for information related to follow-up care and any pertinent home health orders.      Mobility at time of Discharge:   Basic Mobility Inpatient Raw Score: 24  JH-HLM Goal: 8: Walk 250 feet or more  JH-HLM Achieved: 7: Walk 25 feet or more  HLM Goal achieved. Continue to encourage appropriate mobility.     Disposition:   Home    Planned Readmission: No    Discharge Medications:  See after visit summary for reconciled discharge medications provided to patient and/or family.      Administrative Statements   Discharge Statement:  I have spent a total time of 30 minutes in caring for this patient on the day of the visit/encounter. .    **Please Note: This note may have been constructed using a voice recognition system**

## 2025-02-02 NOTE — ASSESSMENT & PLAN NOTE
Presents with LLQ x3 days, CT imaging shows acute diverticulitis in the distal descending colon of the left lower abdomen proximal to prior area of diverticulitis.  No abdominopelvic abscess present  Second episode of diverticulitis in 3 weeks, first case resolved with 7 days of augmentin  Discharged from ED on 1/30 on oral flagyl and levoquin - unable to tolerate levaquin due to GI upset and dry heaving  Patient tolerating oral water and regular diet  Hemodynamically stable  No white count or fever   LLQ of abdomen mildly tender to palpation    Plan:  Will admit and start IV flagyl and IV ceftriaxone  Trend CBC and fever curve  PRN zofran  Lite diet ordered, advance as tolerated  Tylenol PRN for pain  Patient scheduled for colonoscopy as outpatient with GI 6-8 weeks after resolution of diverticulitis

## 2025-02-02 NOTE — ASSESSMENT & PLAN NOTE
Presents with LLQ x3 days, CT imaging shows acute diverticulitis in the distal descending colon of the left lower abdomen proximal to prior area of diverticulitis.  No abdominopelvic abscess present  Second episode of diverticulitis in 3 weeks, first case resolved with 7 days of augmentin  Discharged from ED on 1/30 on oral flagyl and levoquin - unable to tolerate levaquin due to GI upset and dry heaving  Patient tolerating oral water and regular diet  Hemodynamically stable  No white count or fever   LLQ of abdomen mildly tender to palpation    Plan:  Discharge home on cefdinir/Flagyl to complete 10-day course of antibiotic  Follow-up on repeat blood cultures and notify patient's unresolved.  Continue as needed Zofran for nausea  Continue as needed Tylenol for pain  Continued lites diet as tolerated.  Patient has been advised to follow-up with primary care provider/colonoscopy/GI appointment as scheduled.  Patient scheduled for colonoscopy as outpatient with GI 6-8 weeks after resolution of diverticulitis

## 2025-02-02 NOTE — ASSESSMENT & PLAN NOTE
"Unchanged 0.8 cm pancreatic cyst. Per review of the medical record, a \"0.7 cm low-attenuation lesion [was present] in the tail the pancreas\" on the July 20, 2016 CT performed at [facility]. Comparison to remote prior imaging is advised to   assess the need for continued follow-up.    Outpatient follow up recommended  "

## 2025-02-02 NOTE — ASSESSMENT & PLAN NOTE
1 out of 2 Blood cultures from 1/30 positive for streptococcus species  Likely contaminant   Continue follow-up on blood cultures.

## 2025-02-02 NOTE — INCIDENTAL FINDINGS
The following findings require follow up:  Radiographic finding   Finding: PANCREAS: Unchanged hypoattenuating 0.8 cm pancreatic tail lesion (series 302, image 37). The lesion has average Hounsfield's of 10 on the virtual unenhanced images. Normal caliber of the main pancreatic duct.   US imaging demonstrates pancreatic cysts in 2013.   Follow up required: yes   Follow up should be done within 1 week.    Please notify the following clinician to assist with the follow up:   Dr. Loretta Abrams MD    Incidental finding results were discussed with the Patient by Celia Montano MD on 02/02/25.   They expressed understanding and all questions answered.

## 2025-02-02 NOTE — UTILIZATION REVIEW
Initial Clinical Review    Admission: Date/Time/Statement:   Admission Orders (From admission, onward)       Ordered        02/01/25 1857  INPATIENT ADMISSION  Once                          Orders Placed This Encounter   Procedures    INPATIENT ADMISSION     Standing Status:   Standing     Number of Occurrences:   1     Level of Care:   Med Surg [16]     Estimated length of stay:   More than 2 Midnights     Certification:   I certify that inpatient services are medically necessary for this patient for a duration of greater than two midnights. See H&P and MD Progress Notes for additional information about the patient's course of treatment.     ED Arrival Information       Expected   -    Arrival   2/1/2025 16:42    Acuity   Urgent              Means of arrival   Walk-In    Escorted by   Family Member    Service   Hospitalist    Admission type   Emergency              Arrival complaint   vomiting, abnormal lab             Chief Complaint   Patient presents with    Evaluation for Positive Culture     PT states was seen her recently for a diverticulitis flare up. Pt reports was called earlier in for evaluation of a + blood culture result. Pt also notes that she has been unable to tolerate PO abx at home.        Initial Presentation: 59 y.o. female  to ED via walk in from home.    Admitted to inpatient with Dx: acute recurrent diverticulitis.  Presented to ED with vomiting since being seen in ED for diverticulitis flare on 1/30.  Unable to tolerate Levaquin prescribed.  Able to tolerate Flagyl.    Has had abdominal pain for last 3 to 4 days.    PMHx:  GERD, anxiety and colonic polyps. On exam: mild tenderness to palpation in LLQ.   Wbc 6.38.   Imaging shows on 1/30  new foci of diverticulitis just proximal to prior site. 1 out of 2 Blood cultures from 1/30 positive for streptococcus species .  ED treatment:  given Zofran, started on IV ceftriaxone and IV Flagyl.    Plan includes  to continue IV Flagyl and Rocephin.    Trend CBC.  Lite diet and advance as able.  Pain control.  OP colonoscopy in 6 to 8 weeks.  Repeat blood cultures. .     Date: 2/2/25    Day 2: nausea improved.  Diet tolerated.   On exam mildly tender LLQ.  Wbc 5.61.  repeat cultures in process.  Continue IV antibiotics.     ED Treatment-Medication Administration from 02/01/2025 1642 to 02/02/2025 1335         Date/Time Order Dose Route Action     02/01/2025 1738 ondansetron (ZOFRAN) injection 4 mg 4 mg Intravenous Given     02/02/2025 1057 sucralfate (CARAFATE) tablet 1 g 1 g Oral Given     02/01/2025 2138 lifitegrast (XIIDRA) 5 % op solution 1 drop 1 drop Both Eyes Given     02/02/2025 0920 lifitegrast (XIIDRA) 5 % op solution 1 drop 1 drop Both Eyes Given     02/01/2025 2053 metroNIDAZOLE (FLAGYL) IVPB (premix) 500 mg 100 mL 500 mg Intravenous New Bag     02/02/2025 0419 metroNIDAZOLE (FLAGYL) IVPB (premix) 500 mg 100 mL 500 mg Intravenous New Bag     02/02/2025 1208 metroNIDAZOLE (FLAGYL) IVPB (premix) 500 mg 100 mL 500 mg Intravenous New Bag     02/01/2025 2005 ceftriaxone (ROCEPHIN) 1 g/50 mL in dextrose IVPB 1,000 mg Intravenous New Bag     02/02/2025 0422 benzonatate (TESSALON PERLES) capsule 100 mg 100 mg Oral Given     02/02/2025 1208 benzonatate (TESSALON PERLES) capsule 100 mg 100 mg Oral Given            Scheduled Medications:  cefTRIAXone, 1,000 mg, Intravenous, Q24H  FLUoxetine, 10 mg, Oral, Daily  lifitegrast, 1 drop, Both Eyes, BID  metroNIDAZOLE, 500 mg, Intravenous, Q8H  sucralfate, 1 g, Oral, 4x Daily (AC & HS)    Continuous IV Infusions:     PRN Meds:  acetaminophen, 650 mg, Oral, Q6H PRN  benzonatate, 100 mg, Oral, TID PRN - x 2 2/2  ondansetron, 4 mg, Intravenous, Q6H PRN      ED Triage Vitals   Temperature Pulse Respirations Blood Pressure SpO2 Pain Score   02/01/25 1701 02/01/25 1701 02/01/25 1701 02/01/25 1701 02/01/25 1701 02/02/25 0734   99 °F (37.2 °C) 89 18 154/70 98 % No Pain     Weight (last 2 days)       Date/Time Weight    02/02/25  "0734 70 (154.32)            Vital Signs (last 3 days)       Date/Time Temp Pulse Resp BP SpO2 O2 Device Patient Position - Orthostatic VS [place] Coma Scale Score Pain    02/02/25 0734 97.9 °F (36.6 °C) 59 18 116/69 98 % None (Room air) Lying 15 No Pain    02/02/25 0400 -- 72 16 122/72 97 % None (Room air) Lying -- --    02/02/25 0000 -- 74 18 130/78 98 % None (Room air) -- -- --    02/01/25 2217 -- -- -- -- -- -- -- 15 --    02/01/25 2052 -- 77 18 128/76 98 % None (Room air) -- -- --    02/01/25 1701 99 °F (37.2 °C) 89 18 154/70 98 % None (Room air) Lying -- --              Pertinent Labs/Diagnostic Test Results:   Radiology:  No orders to display       1/30/25 ct abdomen - Since January 10, 2025  1.  New acute diverticulitis in the distal descending colon the left lower abdomen immediately proximal to the prior area of diverticulitis. The prior acute diverticulitis has slightly decreased in severity. No abdominopelvic abscess.  2.  Unchanged 0.8 cm pancreatic cyst. Per review of the medical record, a \"0.7 cm low-attenuation lesion [was present] in the tail the pancreas\" on the July 20, 2016 CT performed at [facility] [place]. Comparison to remote prior imaging is advised to   assess the need for continued follow-up.    Cardiology:  No orders to display     GI:  No orders to display       Results from last 7 days   Lab Units 02/02/25 0419 02/01/25 1738 01/30/25  0958   WBC Thousand/uL 5.61 6.38 13.79*   HEMOGLOBIN g/dL 11.8 12.4 12.8   HEMATOCRIT % 36.3 37.7 38.9   PLATELETS Thousands/uL 231 244 243   TOTAL NEUT ABS Thousands/µL 2.42 4.01 10.50*         Results from last 7 days   Lab Units 02/02/25 0419 02/01/25 1738 01/30/25  0958   SODIUM mmol/L 138 140 135   POTASSIUM mmol/L 3.9 4.1 4.0   CHLORIDE mmol/L 108 106 105   CO2 mmol/L 21 26 22   ANION GAP mmol/L 9 8 8   BUN mg/dL 11 13 17   CREATININE mg/dL 0.89 0.93 0.88   EGFR ml/min/1.73sq m 71 67 72   CALCIUM mg/dL 8.8 9.2 8.7     Results from last 7 days "   Lab Units 02/01/25  1738 01/30/25  0958   AST U/L 14 13   ALT U/L 9 8   ALK PHOS U/L 55 49   TOTAL PROTEIN g/dL 6.9 6.9   ALBUMIN g/dL 3.9 4.0   TOTAL BILIRUBIN mg/dL 0.27 0.91     Results from last 7 days   Lab Units 02/02/25  0419 02/01/25  1738 01/30/25  0958   GLUCOSE RANDOM mg/dL 100 95 126     Results from last 7 days   Lab Units 01/30/25  1146   LACTIC ACID mmol/L 1.0     Results from last 7 days   Lab Units 02/01/25  1738 01/30/25  0958   LIPASE u/L 34 15     Results from last 7 days   Lab Units 02/01/25 2001 02/01/25  1848 01/30/25  1146   BLOOD CULTURE  Received in Microbiology Lab. Culture in Progress. Received in Microbiology Lab. Culture in Progress. No Growth at 48 hrs.   GRAM STAIN RESULT   --   --  Gram positive cocci in chains*         Past Medical History:   Diagnosis Date    GERD (gastroesophageal reflux disease)      Present on Admission:   Acute diverticulitis   Gastroesophageal reflux disease without esophagitis   Pancreatic cyst   Positive blood culture      Admitting Diagnosis: No admission diagnoses are documented for this encounter.  Age/Sex: 59 y.o. female    Network Utilization Review Department  ATTENTION: Please call with any questions or concerns to [phone] and carefully listen to the prompts so that you are directed to the right person. All voicemails are confidential.   For Discharge needs, contact Care Management DC Support Team at [phone] opt. 2  Send all requests for admission clinical reviews, approved or denied determinations and any other requests to dedicated fax number below belonging to the campus where the patient is receiving treatment. List of dedicated fax numbers for the Facilities:  FACILITY NAME UR FAX NUMBER   ADMISSION DENIALS (Administrative/Medical Necessity) [phone]   DISCHARGE SUPPORT TEAM (NETWORK) [phone]   PARENT CHILD HEALTH (Maternity/NICU/Pediatrics) [phone]   [facility] [phone]   [facility]  [facility] [phone]   [facility] [phone]   [facility] [phone]   [facility] [phone]   [facility] [phone]   [facility] [phone]   [facility] [phone]   [facility] [phone]   [facility] [phone]   [facility] [phone]   [facility] [phone]

## 2025-02-02 NOTE — QUICK NOTE
I have to patient to take  7-days of antibiotics cefdinir/metronidazole to complete a 10-day course.

## 2025-02-02 NOTE — ED ATTENDING ATTESTATION
2/1/2025  IReymundo MD, saw and evaluated the patient. I have discussed the patient with the resident/non-physician practitioner and agree with the resident's/non-physician practitioner's findings, Plan of Care, and MDM as documented in the resident's/non-physician practitioner's note, except where noted. All available labs and Radiology studies were reviewed.  I was present for key portions of any procedure(s) performed by the resident/non-physician practitioner and I was immediately available to provide assistance.       At this point I agree with the current assessment done in the Emergency Department.  I have conducted an independent evaluation of this patient a history and physical is as follows:    ED Course  ED Course as of 02/02/25 1612   Sat Feb 01, 2025   1803 Er md note- pt  seen and thoroughly evaluated by er md- case d/w er resident-  59 yr female with 2 episodes of diverticulitis in left descending colon/ sigmoid over last month --  seen in er on 1/.30-- fro different llq pain - dx with new area of diverticulitis-- had bc drawn in er-- which today came back pos for strep  - pt placed on levaquin/flagyl-- pt states levaquin caused dry heaving-- vomiting all day yesterday - today better with nausea no vomitus - still with mild llq pain- npt worsening-- no fever/chills- avss- systemic htn- in nad- pulse ox 98 % on ra- interpretation is normal- no intervention - rrr s1/s2- cta-b/soft abd- mild llq tenderness- no peritoneal signs -          Critical Care Time  Procedures

## 2025-02-02 NOTE — DISCHARGE INSTR - AVS FIRST PAGE
Dear Meche Lerma,     It was our pleasure to care for you here at [facility].  It is our hope that we were always able to exceed the expected standards for your care during your stay.  You were hospitalized due to acute diverticulitis.  You were cared for on the ED floor by Celia Montano MD under the service of Carisa Lang* with the [facility] who covers for your primary care physician (PCP), Loretta Abrams MD, while you were hospitalized.  If you have any questions or concerns related to this hospitalization, you may contact us at .  For follow up as well as any medication refills, we recommend that you follow up with your primary care physician.  A registered nurse will reach out to you by phone within a few days after your discharge to answer any additional questions that you may have after going home.  However, at this time we provide for you here, the most important instructions / recommendations at discharge:     Notable Medication Adjustments -   Your Levaquin has been stopped please stop taking it.  Please continue taking your Flagyl (metronidazole) 1 tablet every 8 hours from tonight through 2/8/2025.  Cefdinir tablet has been added to your medications.  Please take 1 tablet by mouth twice daily for 7 days starting from tonight.  Tessalon Perles has been added to your medications.  Please take 1 capsule by mouth 3 times daily as needed for cough.  No other medication adjustments have been made.  Testing Required after Discharge -   None  ** Please contact your PCP to request testing orders for any of the testing recommended here **  Important follow up information -   Please call this number [phone] to make an appointment to follow-up with your primary care physician within 1 week of discharge.  You have upcoming appointments on 03/03 for colonoscopy and doctor appointment on 03/19, please ensure to be there for  these appointments.  Other Instructions -   If you notice worsening abdominal pain, fevers, vomiting, bloody stools please report back to the hospital.  Please review this entire after visit summary as additional general instructions including medication list, appointments, activity, diet, any pertinent wound care, and other additional recommendations from your care team that may be provided for you.      Sincerely,     Celia Montano MD

## 2025-02-03 ENCOUNTER — TELEPHONE (OUTPATIENT)
Age: 60
End: 2025-02-03

## 2025-02-03 NOTE — TELEPHONE ENCOUNTER
Patient calling to reschedule the colonoscopy. Colonoscopy has been rescheduled for 3/20/25 with  at [place]. Patient has prep instructions.

## 2025-02-04 LAB — BACTERIA BLD CULT: NORMAL

## 2025-02-05 ENCOUNTER — TELEPHONE (OUTPATIENT)
Dept: OTHER | Facility: HOSPITAL | Age: 60
End: 2025-02-05

## 2025-02-05 NOTE — TELEPHONE ENCOUNTER
Called patient to let her know that the repeat blood cultures collected on 2/1/25 are showing no growth after 72 hours. She reports doing well and denies abdominal pain at this point. Patient grateful for results.

## 2025-02-07 LAB
BACTERIA BLD CULT: NORMAL
BACTERIA BLD CULT: NORMAL

## 2025-02-10 PROBLEM — A41.9 SEPSIS DUE TO UNDETERMINED ORGANISM (HCC): Status: RESOLVED | Noted: 2025-01-10 | Resolved: 2025-02-10

## 2025-02-25 ENCOUNTER — RESULTS FOLLOW-UP (OUTPATIENT)
Dept: GASTROENTEROLOGY | Facility: CLINIC | Age: 60
End: 2025-02-25

## 2025-02-27 ENCOUNTER — RESULTS FOLLOW-UP (OUTPATIENT)
Dept: GASTROENTEROLOGY | Facility: CLINIC | Age: 60
End: 2025-02-27

## 2025-02-27 LAB
CORTIS AM PEAK SERPL-MCNC: 14.8 MCG/DL
CRP SERPL-MCNC: 3.9 MG/L
ERYTHROCYTE [SEDIMENTATION RATE] IN BLOOD BY WESTERGREN METHOD: 9 MM/H
ESTROGEN SERPL-MCNC: 78 PG/ML
FOLATE SERPL-MCNC: 15 NG/ML
PROGEST SERPL-MCNC: <0.5 NG/ML
TSH SERPL-ACNC: 2.22 MIU/L (ref 0.4–4.5)
VIT B12 SERPL-MCNC: 650 PG/ML (ref 200–1100)

## 2025-03-03 DIAGNOSIS — K21.9 GASTROESOPHAGEAL REFLUX DISEASE WITHOUT ESOPHAGITIS: ICD-10-CM

## 2025-03-04 PROBLEM — R05.9 COUGH: Status: RESOLVED | Noted: 2025-02-02 | Resolved: 2025-03-04

## 2025-03-04 RX ORDER — SUCRALFATE 1 G/1
1 TABLET ORAL 4 TIMES DAILY
Qty: 120 TABLET | Refills: 0 | Status: SHIPPED | OUTPATIENT
Start: 2025-03-04

## 2025-03-17 ENCOUNTER — TELEPHONE (OUTPATIENT)
Age: 60
End: 2025-03-17

## 2025-03-19 DIAGNOSIS — K21.9 GASTROESOPHAGEAL REFLUX DISEASE WITHOUT ESOPHAGITIS: ICD-10-CM

## 2025-03-20 ENCOUNTER — HOSPITAL ENCOUNTER (OUTPATIENT)
Dept: GASTROENTEROLOGY | Facility: HOSPITAL | Age: 60
Setting detail: OUTPATIENT SURGERY
Discharge: HOME | End: 2025-03-20
Payer: COMMERCIAL

## 2025-03-20 ENCOUNTER — ANESTHESIA EVENT (OUTPATIENT)
Dept: GASTROENTEROLOGY | Facility: HOSPITAL | Age: 60
End: 2025-03-20
Payer: COMMERCIAL

## 2025-03-20 ENCOUNTER — ANESTHESIA (OUTPATIENT)
Dept: GASTROENTEROLOGY | Facility: HOSPITAL | Age: 60
End: 2025-03-20
Payer: COMMERCIAL

## 2025-03-20 VITALS
HEIGHT: 67 IN | OXYGEN SATURATION: 99 % | TEMPERATURE: 98 F | RESPIRATION RATE: 16 BRPM | HEART RATE: 62 BPM | DIASTOLIC BLOOD PRESSURE: 70 MMHG | SYSTOLIC BLOOD PRESSURE: 108 MMHG | WEIGHT: 149.69 LBS | BODY MASS INDEX: 23.49 KG/M2

## 2025-03-20 DIAGNOSIS — K59.01 SLOW TRANSIT CONSTIPATION: ICD-10-CM

## 2025-03-20 PROCEDURE — 45378 DIAGNOSTIC COLONOSCOPY: CPT | Performed by: INTERNAL MEDICINE

## 2025-03-20 RX ORDER — SUCRALFATE 1 G/1
1 TABLET ORAL 4 TIMES DAILY
Qty: 120 TABLET | Refills: 5 | Status: SHIPPED | OUTPATIENT
Start: 2025-03-20

## 2025-03-20 RX ORDER — SODIUM CHLORIDE, SODIUM LACTATE, POTASSIUM CHLORIDE, CALCIUM CHLORIDE 600; 310; 30; 20 MG/100ML; MG/100ML; MG/100ML; MG/100ML
125 INJECTION, SOLUTION INTRAVENOUS CONTINUOUS
Status: DISCONTINUED | OUTPATIENT
Start: 2025-03-20 | End: 2025-03-24 | Stop reason: HOSPADM

## 2025-03-20 RX ORDER — PROPOFOL 10 MG/ML
INJECTION, EMULSION INTRAVENOUS CONTINUOUS PRN
Status: DISCONTINUED | OUTPATIENT
Start: 2025-03-20 | End: 2025-03-20

## 2025-03-20 RX ORDER — PROPOFOL 10 MG/ML
INJECTION, EMULSION INTRAVENOUS AS NEEDED
Status: DISCONTINUED | OUTPATIENT
Start: 2025-03-20 | End: 2025-03-20

## 2025-03-20 RX ADMIN — SODIUM CHLORIDE, SODIUM LACTATE, POTASSIUM CHLORIDE, AND CALCIUM CHLORIDE 125 ML/HR: .6; .31; .03; .02 INJECTION, SOLUTION INTRAVENOUS at 06:58

## 2025-03-20 RX ADMIN — PROPOFOL 150 MCG/KG/MIN: 10 INJECTION, EMULSION INTRAVENOUS at 07:50

## 2025-03-20 RX ADMIN — PROPOFOL 80 MG: 10 INJECTION, EMULSION INTRAVENOUS at 07:50

## 2025-03-20 NOTE — ANESTHESIA POSTPROCEDURE EVALUATION
Post-Op Assessment Note    CV Status:  Stable    Pain management: adequate       Mental Status:  Alert and awake   Hydration Status:  Euvolemic   PONV Controlled:  Controlled   Airway Patency:  Patent     Post Op Vitals Reviewed: Yes    No anethesia notable event occurred.    Staff: CRNA           Last Filed PACU Vitals:  Vitals Value Taken Time   Temp 98 °F (36.7 °C) 03/20/25 0805   Pulse 52 03/20/25 0805   BP 91/57 03/20/25 0805   Resp 16 03/20/25 0805   SpO2 99 % 03/20/25 0805       Modified Shailesh:     Vitals Value Taken Time   Activity 2 03/20/25 0805   Respiration 2 03/20/25 0805   Circulation 2 03/20/25 0805   Consciousness 1 03/20/25 0805   Oxygen Saturation 2 03/20/25 0805     Modified Shailesh Score: 9

## 2025-03-20 NOTE — H&P
"History and Physical -  [facility]  Meche Lerma 59 y.o. female MRN: [id number]      HPI: Meche Lerma is a 59 y.o. year old female who presents for follow-up evaluation of a recent episode of acute diverticulitis      REVIEW OF SYSTEMS: Per the HPI, and otherwise unremarkable.    Historical Information   Past Medical History:   Diagnosis Date    GERD (gastroesophageal reflux disease)      Past Surgical History:   Procedure Laterality Date    BUNIONECTOMY Right     COLONOSCOPY       Social History   Social History     Substance and Sexual Activity   Alcohol Use Yes    Alcohol/week: 1.0 standard drink of alcohol    Types: 1 Glasses of wine per week    Comment: socially     Social History     Substance and Sexual Activity   Drug Use Never     Social History     Tobacco Use   Smoking Status Never   Smokeless Tobacco Never     Family History   Problem Relation Age of Onset    Hypertension Mother     Colon polyps Father     Heart disease Father     Colon cancer Paternal Grandmother        Meds/Allergies     Not in a hospital admission.    No Known Allergies    Objective     Blood pressure 124/62, pulse 55, temperature 97.6 °F (36.4 °C), temperature source Temporal, resp. rate (!) 23, height 5' 7\" (1.702 m), weight 67.9 kg (149 lb 11.1 oz), SpO2 99%.      PHYSICAL EXAM    Gen: NAD  CV: RRR  CHEST: Clear  ABD: soft, NT/ND  EXT: no edema      ASSESSMENT/PLAN:  This is a 59 y.o. year old female here for colonoscopy, and she is stable and optimized for her procedure.          "

## 2025-03-20 NOTE — ANESTHESIA PREPROCEDURE EVALUATION
Procedure:  COLONOSCOPY    Relevant Problems   GI/HEPATIC   (+) Gastroesophageal reflux disease without esophagitis   (+) Pancreatic cyst      NEURO/PSYCH   (+) Anxiety        Physical Exam    Airway    Mallampati score: II  TM Distance: >3 FB  Neck ROM: full     Dental   No notable dental hx     Cardiovascular      Pulmonary      Other Findings  post-pubertal.      Anesthesia Plan  ASA Score- 2     Anesthesia Type- IV sedation with anesthesia with ASA Monitors.         Additional Monitors:     Airway Plan:            Plan Factors-Exercise tolerance (METS): >4 METS.    Chart reviewed.    Patient summary reviewed.                  Induction- intravenous.    Postoperative Plan-     Perioperative Resuscitation Plan - Level 1 - Full Code.       Informed Consent- Anesthetic plan and risks discussed with patient.  I personally reviewed this patient with the CRNA. Discussed and agreed on the Anesthesia Plan with the CRNA..      NPO Status:  Vitals Value Taken Time   Date of last liquid 03/19/25 03/20/25 0651   Time of last liquid 2300 03/20/25 0651   Date of last solid 03/18/25 03/20/25 0651   Time of last solid 1800 03/20/25 0651

## 2025-04-02 ENCOUNTER — OFFICE VISIT (OUTPATIENT)
Dept: GASTROENTEROLOGY | Facility: CLINIC | Age: 60
End: 2025-04-02
Payer: COMMERCIAL

## 2025-04-02 VITALS
BODY MASS INDEX: 23.54 KG/M2 | OXYGEN SATURATION: 97 % | TEMPERATURE: 97.3 F | HEART RATE: 73 BPM | SYSTOLIC BLOOD PRESSURE: 128 MMHG | DIASTOLIC BLOOD PRESSURE: 80 MMHG | WEIGHT: 150 LBS | HEIGHT: 67 IN

## 2025-04-02 DIAGNOSIS — R14.0 BLOATING: ICD-10-CM

## 2025-04-02 DIAGNOSIS — K59.01 SLOW TRANSIT CONSTIPATION: ICD-10-CM

## 2025-04-02 DIAGNOSIS — K21.9 GASTROESOPHAGEAL REFLUX DISEASE WITHOUT ESOPHAGITIS: Primary | ICD-10-CM

## 2025-04-02 PROCEDURE — 99213 OFFICE O/P EST LOW 20 MIN: CPT | Performed by: PHYSICIAN ASSISTANT

## 2025-04-02 NOTE — ASSESSMENT & PLAN NOTE
59-year-old female who presents for follow-up of GERD, constipation, abdominal bloating.  Patient is status post colonoscopy 3/20/2025.  Patient did have evidence of diverticular disease.    As far as patient acid reflux is concerned.  She has had an EGD with [facility] testing. There was no evidence of esophagitis or gastritis at that time. Patient's Terry and DeMeester score was 4.8 which did not indicate significant acid reflux.  Recent stool H. pylori testing negative.    Patient currently has been maintained on Zegerid daily and Carafate 4 times a day.  She reports that her symptoms are tolerable.    Patient will be scheduled for a gastric emptying study.    Patient will be scheduled for an esophagram.    Patient is following up with Dr. Stockton our motility specialist on 424.

## 2025-04-02 NOTE — PROGRESS NOTES
Name: Meche Lerma      : 1965      MRN: [id number]  Encounter Provider: Kelsey Jones PA-C  Encounter Date: 2025   Encounter department: [facility] [place]  :  Assessment & Plan  Gastroesophageal reflux disease without esophagitis  59-year-old female who presents for follow-up of GERD, constipation, abdominal bloating.  Patient is status post colonoscopy 3/20/2025.  Patient did have evidence of diverticular disease.    As far as patient acid reflux is concerned.  She has had an EGD with [facility] testing. There was no evidence of esophagitis or gastritis at that time. Patient's Terry and DeMeester score was 4.8 which did not indicate significant acid reflux.  Recent stool H. pylori testing negative.    Patient currently has been maintained on Zegerid daily and Carafate 4 times a day.  She reports that her symptoms are tolerable.    Patient will be scheduled for a gastric emptying study.    Patient will be scheduled for an esophagram.    Patient is following up with Dr. Stockton our motility specialist on 424.  Slow transit constipation  Patient's constipation is improved.  Will continue MiraLAX daily.    Patient will discontinue IBgard as she does not feel like this is working.    Patient will continue high-fiber diet, plenty of water intake, daily exercise.    Colonoscopy from 3/20/2025 reviewed.  Patient did have evidence of diverticular disease.    Bloating  Patient's laboratories to include hormone testing were essentially unremarkable    Patient has had negative SIBO testing.    Patient will follow-up 1 month after seeing Dr. Stockton.    History of Present Illness   HPI  Meche Lerma is a 59 y.o. female who presents for GI follow-up.  Patient reports that she is here to follow-up after her colonoscopy that was performed.  Patient's colonoscopy performed by Dr. Correa 2 weeks did show evidence of diverticular disease.      At patient's last appointment we had a long  discussion about her ongoing episodes of acid reflux and abdominal bloating.  Patient reports that she has currently been utilizing Zegerid in the morning as well as Carafate 4 times a day and that her acid reflux symptoms are tolerable.  Patient unfortunately did try Vonoprazan with no improvement in symptoms.  Patient has tried all PPI therapies available but unfortunately has either had no improvement in symptoms or side effects. Patient reports that she also failed famotidine due to side effects. Patient reports that she has had SIBO testing which was negative. She has also went on a strict gluten and dairy free diet for 2 months without any change in her symptoms. She reports that she did low-carb for about a month again with no change in her symptoms. Patient did undergo an EGD with [facility]. There was no evidence of esophagitis or gastritis at that time. Patient's Terry and DeMeester score was 4.8 which did not indicate significant acid reflux.     Patient reports that she exercises daily and eats a very well-balanced diet.     Patient reports that since her last appointment she has been utilizing 1 capful of MiraLAX.  She has been having a bowel movement daily or sometimes 2 bowel movements daily.  She denies any melena or rectal bleeding.  She reports that she has tried Gas-X with no benefit in symptoms.  She reports that she has also been taking IBgard 2 in the AM and 2 in the PM with no benefit in symptoms.        Review of Systems   Constitutional:  Negative for chills and fever.   HENT:  Negative for ear pain and sore throat.    Eyes:  Negative for pain and visual disturbance.   Respiratory:  Negative for cough and shortness of breath.    Cardiovascular:  Negative for chest pain and palpitations.   Gastrointestinal:  Negative for abdominal pain and vomiting.   Genitourinary:  Negative for dysuria and hematuria.   Musculoskeletal:  Negative for arthralgias and back pain.   Skin:  Negative for color change  "and rash.   Neurological:  Negative for seizures and syncope.   All other systems reviewed and are negative.         Objective   /80 (BP Location: Left arm, Patient Position: Sitting, Cuff Size: Standard)   Pulse 73   Temp (!) 97.3 °F (36.3 °C) (Temporal)   Ht 5' 7\" (1.702 m)   Wt 68 kg (150 lb)   SpO2 97%   BMI 23.49 kg/m²      Physical Exam  Vitals and nursing note reviewed.   Constitutional:       General: She is not in acute distress.     Appearance: She is well-developed.   HENT:      Head: Normocephalic and atraumatic.   Eyes:      Conjunctiva/sclera: Conjunctivae normal.   Cardiovascular:      Rate and Rhythm: Normal rate and regular rhythm.      Heart sounds: No murmur heard.  Pulmonary:      Effort: Pulmonary effort is normal. No respiratory distress.      Breath sounds: Normal breath sounds.   Abdominal:      Palpations: Abdomen is soft.      Tenderness: There is no abdominal tenderness.   Musculoskeletal:         General: No swelling.      Cervical back: Neck supple.   Skin:     General: Skin is warm and dry.      Capillary Refill: Capillary refill takes less than 2 seconds.   Neurological:      Mental Status: She is alert.   Psychiatric:         Mood and Affect: Mood normal.           "

## 2025-04-08 ENCOUNTER — HOSPITAL ENCOUNTER (OUTPATIENT)
Dept: NON INVASIVE DIAGNOSTICS | Facility: CLINIC | Age: 60
Discharge: HOME | End: 2025-04-08
Payer: COMMERCIAL

## 2025-04-08 DIAGNOSIS — K21.9 GASTROESOPHAGEAL REFLUX DISEASE WITHOUT ESOPHAGITIS: ICD-10-CM

## 2025-04-08 PROCEDURE — A9541 TC99M SULFUR COLLOID: HCPCS

## 2025-04-08 PROCEDURE — 78264 GASTRIC EMPTYING IMG STUDY: CPT

## 2025-04-09 ENCOUNTER — RESULTS FOLLOW-UP (OUTPATIENT)
Dept: GASTROENTEROLOGY | Facility: CLINIC | Age: 60
End: 2025-04-09

## 2025-04-10 ENCOUNTER — HOSPITAL ENCOUNTER (OUTPATIENT)
Dept: RADIOLOGY | Facility: HOSPITAL | Age: 60
End: 2025-04-10
Payer: COMMERCIAL

## 2025-04-10 DIAGNOSIS — K21.9 GASTROESOPHAGEAL REFLUX DISEASE WITHOUT ESOPHAGITIS: ICD-10-CM

## 2025-04-10 PROCEDURE — 74220 X-RAY XM ESOPHAGUS 1CNTRST: CPT

## 2025-04-24 ENCOUNTER — OFFICE VISIT (OUTPATIENT)
Dept: GASTROENTEROLOGY | Facility: CLINIC | Age: 60
End: 2025-04-24
Payer: COMMERCIAL

## 2025-04-24 VITALS — DIASTOLIC BLOOD PRESSURE: 72 MMHG | SYSTOLIC BLOOD PRESSURE: 128 MMHG | TEMPERATURE: 98.2 F

## 2025-04-24 DIAGNOSIS — K21.9 GASTROESOPHAGEAL REFLUX DISEASE WITHOUT ESOPHAGITIS: Primary | ICD-10-CM

## 2025-04-24 DIAGNOSIS — R10.13 DYSPEPSIA: ICD-10-CM

## 2025-04-24 DIAGNOSIS — K86.2 PANCREATIC CYST: ICD-10-CM

## 2025-04-24 DIAGNOSIS — K57.92 ACUTE DIVERTICULITIS: ICD-10-CM

## 2025-04-24 PROCEDURE — 99214 OFFICE O/P EST MOD 30 MIN: CPT | Performed by: INTERNAL MEDICINE

## 2025-04-24 RX ORDER — OMEPRAZOLE AND SODIUM BICARBONATE 40; 1100 MG/1; MG/1
1 CAPSULE ORAL
COMMUNITY

## 2025-04-24 NOTE — PROGRESS NOTES
2Name: Meche Lerma      : 1965      MRN: [id number]  Encounter Provider: Raul Stockton MD  Encounter Date: 2025   Encounter department: [facility] [place]  :  Assessment & Plan  Gastroesophageal reflux disease without esophagitis    Orders:    Esoph manometry/24hr ph; Future    Pancreatic cyst         Acute diverticulitis         Dyspepsia    Orders:    Small intestinal bacterial overgrowth      Assessment & Plan  1. Gastroesophageal Reflux Disease (GERD):  - Persistent GERD symptoms despite various PPIs, including Voquenza and Zegerid (omeprazole with bicarbonate)  - Burning sensation in the mouth and constant acid presence, alleviated by Tums  - Bravo study indicated a pH > 4, suggesting weak acid reflux  - Conduct manometry test to assess esophageal contraction and rule out hiatal hernia  - Perform 24-hour pH study to quantify weak acid reflux and alkaline reflux  - Discontinue Zegerid for 7 days and Tums and Carafate for 2 to 3 days prior to tests  - Consider anti-reflux procedure if pH study reveals significant alkaline or weak acid reflux  - Classify condition as functional reflux if all tests return normal results; consider amitriptyline as treatment option    2. Abdominal Bloating:  - Severe abdominal bloating, possibly pushing acid up  - Previous SIBO testing using glucose was negative; may miss distal small bowel overgrowth  - Conduct SIBO test using lactulose to address abdominal distention  - Advise low FODMAP diet after completing SIBO test to identify food triggers for bloating    3. Acute Diverticulitis:  - Recent episode of acute diverticulitis on 01/10/2025  - Maintain high-fiber diet to prevent future episodes    4. Pancreatic Cyst:  - Pancreatic cyst measuring 0.8 cm in the pancreatic tail, unchanged from previous scans  - Continue follow-up with Dr. Taylor for monitoring      History of Present Illness     History of Present Illness  The patient  is a 59-year-old female who presents for a second opinion regarding her acid reflux disease, bloating, and constipation.    She has a history of GERD, constipation, and abdominal bloating. A colonoscopy performed on 03/20/2025 did not show evidence of diverticular disease. An EGD with Bravo testing showed no evidence of esophagitis or gastritis, and the DeMeester score was 4.8, indicating some acid reflux. Recent stool studies were negative for H. pylori. She has been maintained on Zyrtec daily and Carafate four times a day. An esophagram demonstrated mixed spontaneous provoked gastroesophageal reflux without evidence of long-term complications such as rings, ulcers, or peptic stricture. Otherwise, the esophagram was within normal limits. Gastroesophageal motility was normal with prompt emptying of the esophagus into the stomach. A gastric emptying study was also within normal limits. A CT scan of the abdomen and pelvis on 01/10/2025 showed new acute diverticulitis in the distal descending colon, with a decrease in severity. She also has a pancreatic cyst measuring 0.8 cm, located in the pancreatic tail, which is unchanged from previous scans.    Her symptoms began approximately a year ago with severe upper respiratory infection-like symptoms, including intense pain, difficulty breathing, and the need to rest after climbing stairs. Despite being physically fit and not asthmatic, she experienced these symptoms. Her primary care physician found her lungs to be clear but prescribed inhalers, allergy medications, and steroid inhalers. A chest x-ray and CT scan were performed, both of which were unremarkable. She was tested for RSV and COVID-19, both of which were negative. Suspecting silent acid reflux, she consulted a gastroenterologist, Dr. Mejia, who initiated treatment with a proton pump inhibitor (PPI). This resulted in significant improvement, although she continues to experience a persistent cough. Various PPIs  have been tried, including Voquezna twice daily, which have made her condition tolerable but not completely resolved. She reports a constant presence of acid in her mouth and a burning sensation, which is temporarily relieved by Tums. She has sought dental care for tooth pain and uses fluoride. Her symptoms are less severe at night when she sleeps in an elevated position. Despite dietary modifications, including eliminating fried foods, dairy, wheat, and coffee, her symptoms persist. She underwent a Bravo study off PPI for 2 weeks, which did not reveal any abnormalities. She has not yet consulted an ENT specialist. A recent swallow test did not indicate a hiatal hernia but confirmed GERD. She is considering trying Dexilant. Currently, she is taking over-the-counter Zegerid and supplements it with Carafate twice daily as needed for breakthrough symptoms.    She also reports severe bloating, which she believes may be exacerbating her acid reflux. She has undergone SIBO testing through functional medicine, which was negative. She has not had any abdominal surgeries. She has attempted a lactose-free diet for 2 months without success and has not tried a low FODMAP diet.    SOCIAL HISTORY  Occupation: Works as a .  Exercise: Works out regularly.  Diet: Cut out dairy, wheat, and coffee.  Coffee/Tea/Caffeine-containing Drinks: Cut out coffee.    Review of Systems   Gastrointestinal:  Positive for abdominal distention.    A complete review of systems is negative other than that noted above in the HPI.      Current Outpatient Medications   Medication Sig Dispense Refill    cyclobenzaprine (FLEXERIL) 5 mg tablet Take 5 mg by mouth 3 (three) times a day as needed for muscle spasms      FLUoxetine (PROzac) 10 mg capsule Take 10 mg by mouth daily      Magnesium 125 MG CAPS       Omega-3 Fatty Acids (Omega-3 Fish Oil) 1000 MG CAPS Fish Oil      polyethylene glycol (MiraLax) 17 GM/SCOOP powder       sucralfate  (CARAFATE) 1 g tablet Take 1 tablet (1 g total) by mouth 4 (four) times a day 120 tablet 5    Xiidra 5 % op solution       omeprazole-sodium bicarbonate (ZEGERID)  MG per capsule Take 1 capsule by mouth daily before breakfast       No current facility-administered medications for this visit.     Objective   /72 (BP Location: Right arm, Patient Position: Sitting, Cuff Size: Standard)   Temp 98.2 °F (36.8 °C) (Tympanic Core)     Physical Exam  HENT:      Head: Normocephalic and atraumatic.   Cardiovascular:      Rate and Rhythm: Normal rate and regular rhythm.   Pulmonary:      Effort: Pulmonary effort is normal.      Breath sounds: Normal breath sounds.   Abdominal:      General: Bowel sounds are normal. There is no distension.      Palpations: Abdomen is soft.      Tenderness: There is no abdominal tenderness. There is no rebound.   Musculoskeletal:      Cervical back: Normal range of motion.   Skin:     General: Skin is warm.   Neurological:      Mental Status: She is oriented to person, place, and time.        Physical Exam  Heart: Normal cardiac exam  Abdomen: Slightly distended    Results  Labs   - H. pylori test: 2025, Negative   - TSH: 2025, Within normal limits   - SIBO testin2025, Normal hydrogen and methane levels    Imaging   - Colonoscopy: 2025, No evidence of diverticular disease   - EGD with Bravo testing: No evidence of esophagitis or gastritis, DeMeester score was 4.8   - Esophagram: Mixed spontaneous provoked gastroesophageal reflux without evidence of long term complications such as rings, ulcers, peptic stricture   - Gastric emptying study: Within normal limits   - CT scan of abdomen and pelvis: 01/10/2025, New acute diverticulitis in the distal descending colon, lower abdomen proximal area of diverticulitis, acute diverticulitis, decrease in severity. Pancreatic cyst measures 0.8 cm and is unchanged and located in the pancreatic tail  Lab Results: I personally  reviewed relevant lab results.       Results for orders placed during the hospital encounter of 03/20/25    Colonoscopy    Impression  Diverticulosis of moderate severity in the proximal sigmoid colon, mid sigmoid colon and distal sigmoid colon  The cecum, ascending colon, hepatic flexure, transverse colon, splenic flexure, descending colon, rectosigmoid, rectum and anal region appeared normal.        RECOMMENDATION:  Repeat screening colonoscopy in 10 years, due: 3/18/2035      High-fiber diet to include fruits, vegetables, whole-grain foods, daily      Tobias Correa DO  FACG, FACP

## 2025-04-29 ENCOUNTER — TELEPHONE (OUTPATIENT)
Dept: GASTROENTEROLOGY | Facility: HOSPITAL | Age: 60
End: 2025-04-29

## 2025-05-07 ENCOUNTER — HOSPITAL ENCOUNTER (OUTPATIENT)
Dept: GASTROENTEROLOGY | Facility: HOSPITAL | Age: 60
Discharge: HOME | End: 2025-05-07
Attending: INTERNAL MEDICINE
Payer: COMMERCIAL

## 2025-05-07 VITALS
DIASTOLIC BLOOD PRESSURE: 78 MMHG | HEART RATE: 76 BPM | RESPIRATION RATE: 16 BRPM | OXYGEN SATURATION: 98 % | TEMPERATURE: 97.5 F | SYSTOLIC BLOOD PRESSURE: 111 MMHG

## 2025-05-07 DIAGNOSIS — K21.9 GASTROESOPHAGEAL REFLUX DISEASE WITHOUT ESOPHAGITIS: ICD-10-CM

## 2025-05-07 PROCEDURE — 91038 ESOPH IMPED FUNCT TEST > 1HR: CPT

## 2025-05-07 PROCEDURE — 91010 ESOPHAGUS MOTILITY STUDY: CPT

## 2025-05-07 NOTE — PERIOPERATIVE NURSING NOTE
Patient brought in the room and explained the esophageal manometry procedure. After the confirmation of allergies, Lidocaine 2% viscous solution given via right/ left nostrils and  a transnasal insertion of the High Resolution esophageal manometry catheter was inserted via right nostril. Patient given water to drink during the insertion and once the catheter inserted pressure bands of both Upper esophageal sphincter  (UES) and Lower esophageal sphincter ( LES) were observed on the color contour. Patient instructed to take a deep breath to verify placement of the catheter, diaphragmatic pinch noted on inspiration. Catheter was secured to right cheek. Patient was assisted to supine position .Patient was instructed to relax  while acclimating the catheter for about 5 minutes. A 30 second baseline resting pressure was obtained to identify the UES and LES followed by a series of 10  wet liquid swallows using 5 cc room temperature normal saline to assess esophageal motility and bolus transit. Patient administered 10 wet viscous swallows using 5 cc viscous solution, 1 multiple rapid drink swallow using 2 cc room temperature normal saline given a total of 5 drinks. Patient instructed to sit up at the edge of the stretcher and given 5 upright liquid swallows using 5 cc room temperature normal saline and 1 rapid drink challenge using 200 cc room temperature water over 30 seconds. At the end of the procedure the high resolution esophageal manometry catheter was removed from the nostril intact.  sensor PH probe inserted via right nostril and secured. Zephr recorder teachback performed and patient verbalized understanding. Patient instructed to return next day to have probe remove. Discharge instructions given and patient ambulated out of room in stable condition.

## 2025-05-12 ENCOUNTER — TELEPHONE (OUTPATIENT)
Age: 60
End: 2025-05-12

## 2025-05-12 PROCEDURE — 91010 ESOPHAGUS MOTILITY STUDY: CPT | Performed by: INTERNAL MEDICINE

## 2025-05-12 PROCEDURE — 91038 ESOPH IMPED FUNCT TEST > 1HR: CPT | Performed by: INTERNAL MEDICINE

## 2025-05-12 NOTE — TELEPHONE ENCOUNTER
Pt calling stating that she has to drop a sibo test off at the office and she remembers someone telling her not to drop it off on a Friday. I called the [facility] office and spoke w/ Abi who stated pt can drop it off but there must be someone there to accept it. Pt has to hand it to someone. They will log it in as an appt and pt will get a message have you arrived at your appt but it is not an appt. I informed pt and she understood all.

## 2025-05-27 ENCOUNTER — OFFICE VISIT (OUTPATIENT)
Dept: GASTROENTEROLOGY | Facility: CLINIC | Age: 60
End: 2025-05-27
Payer: COMMERCIAL

## 2025-05-27 DIAGNOSIS — R14.0 ABDOMINAL DISTENSION: Primary | ICD-10-CM

## 2025-05-27 PROCEDURE — PBNCHG PB NO CHARGE PLACEHOLDER: Performed by: INTERNAL MEDICINE

## 2025-05-27 PROCEDURE — 91065 BREATH HYDROGEN/METHANE TEST: CPT | Performed by: INTERNAL MEDICINE

## 2025-05-27 NOTE — PROGRESS NOTES
[facility]       Bacterial Overgrowth Analytical Record    Meche Lerma 59 y.o. female MRN: [id number]      Date of Test: 5/23/2025    Substrate Given: Lactulose    Ordering Provider: Dr. Stockton    Medical Assistant: Naomi BUSTOS    Symptoms: bloating and Abd distension    The patient presents for bacterial overgrowth testing.    Patient fasted overnight. Baseline readings obtained.   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  Defer management plan to patient's GI provider Dr. Stockton.

## 2025-05-30 ENCOUNTER — RESULTS FOLLOW-UP (OUTPATIENT)
Dept: GASTROENTEROLOGY | Facility: CLINIC | Age: 60
End: 2025-05-30

## 2025-05-30 NOTE — TELEPHONE ENCOUNTER
----- Message from Raul Stockton MD sent at 5/30/2025 10:44 AM EDT -----  There is evidence of acid reflux disease but not in the pathological range  Manometry is within normal limits  ----- Message -----  From: Raul Stockton MD  Sent: 5/12/2025   1:48 PM EDT  To: Raul Stockton MD

## 2025-06-13 DIAGNOSIS — K63.8219 SMALL INTESTINAL BACTERIAL OVERGROWTH (SIBO): Primary | ICD-10-CM

## 2025-06-13 RX ORDER — NEOMYCIN SULFATE 500 MG/1
500 TABLET ORAL 2 TIMES DAILY
Qty: 28 TABLET | Refills: 0 | Status: SHIPPED | OUTPATIENT
Start: 2025-06-13 | End: 2025-06-27

## 2025-07-14 ENCOUNTER — TELEPHONE (OUTPATIENT)
Dept: GASTROENTEROLOGY | Facility: CLINIC | Age: 60
End: 2025-07-14